# Patient Record
Sex: MALE | Race: WHITE | Employment: OTHER | URBAN - METROPOLITAN AREA
[De-identification: names, ages, dates, MRNs, and addresses within clinical notes are randomized per-mention and may not be internally consistent; named-entity substitution may affect disease eponyms.]

---

## 2017-01-13 ENCOUNTER — TELEPHONE (OUTPATIENT)
Dept: CARDIOLOGY | Age: 72
End: 2017-01-13

## 2017-01-17 ENCOUNTER — CARE COORDINATION (OUTPATIENT)
Dept: FAMILY MEDICINE CLINIC | Age: 72
End: 2017-01-17

## 2017-01-17 DIAGNOSIS — I48.91 ATRIAL FIBRILLATION, UNSPECIFIED TYPE (HCC): ICD-10-CM

## 2017-01-17 PROBLEM — I50.9 CCF (CONGESTIVE CARDIAC FAILURE) (HCC): Status: ACTIVE | Noted: 2017-01-17

## 2017-01-17 RX ORDER — SOTALOL HYDROCHLORIDE 80 MG/1
80 TABLET ORAL 2 TIMES DAILY
COMMUNITY
Start: 2017-01-12 | End: 2017-07-11 | Stop reason: SDUPTHER

## 2017-01-18 ENCOUNTER — OFFICE VISIT (OUTPATIENT)
Dept: CARDIOLOGY | Age: 72
End: 2017-01-18

## 2017-01-18 ENCOUNTER — NURSE ONLY (OUTPATIENT)
Dept: CARDIOLOGY | Age: 72
End: 2017-01-18

## 2017-01-18 VITALS
DIASTOLIC BLOOD PRESSURE: 80 MMHG | OXYGEN SATURATION: 99 % | SYSTOLIC BLOOD PRESSURE: 120 MMHG | HEIGHT: 73 IN | WEIGHT: 315 LBS | BODY MASS INDEX: 41.75 KG/M2 | HEART RATE: 50 BPM

## 2017-01-18 DIAGNOSIS — I48.91 ATRIAL FIBRILLATION STATUS POST CARDIOVERSION (HCC): Primary | ICD-10-CM

## 2017-01-18 DIAGNOSIS — I50.22 CHRONIC SYSTOLIC CONGESTIVE HEART FAILURE (HCC): ICD-10-CM

## 2017-01-18 DIAGNOSIS — I48.20 CHRONIC ATRIAL FIBRILLATION (HCC): ICD-10-CM

## 2017-01-18 DIAGNOSIS — I48.91 ATRIAL FIBRILLATION, UNSPECIFIED TYPE (HCC): ICD-10-CM

## 2017-01-18 DIAGNOSIS — I48.91 ATRIAL FIBRILLATION, UNSPECIFIED TYPE (HCC): Primary | ICD-10-CM

## 2017-01-18 DIAGNOSIS — E66.01 MORBID OBESITY WITH BMI OF 45.0-49.9, ADULT (HCC): ICD-10-CM

## 2017-01-18 DIAGNOSIS — I49.8 BIGEMINY: ICD-10-CM

## 2017-01-18 PROCEDURE — 99214 OFFICE O/P EST MOD 30 MIN: CPT | Performed by: PHYSICIAN ASSISTANT

## 2017-01-18 ASSESSMENT — ENCOUNTER SYMPTOMS
BLOOD IN STOOL: 0
CHEST TIGHTNESS: 0
COUGH: 1
ABDOMINAL PAIN: 0
COLOR CHANGE: 0
VOMITING: 0
DIARRHEA: 0
SHORTNESS OF BREATH: 0
NAUSEA: 0

## 2017-01-20 ENCOUNTER — NURSE ONLY (OUTPATIENT)
Dept: CARDIOLOGY | Age: 72
End: 2017-01-20

## 2017-01-20 DIAGNOSIS — I48.91 ATRIAL FIBRILLATION, UNSPECIFIED TYPE (HCC): Primary | ICD-10-CM

## 2017-01-24 ENCOUNTER — TELEPHONE (OUTPATIENT)
Dept: CARDIOLOGY | Age: 72
End: 2017-01-24

## 2017-01-24 DIAGNOSIS — I48.91 ATRIAL FIBRILLATION, UNSPECIFIED TYPE (HCC): Primary | ICD-10-CM

## 2017-01-24 DIAGNOSIS — R94.31 ABNORMAL HOLTER MONITOR FINDING: ICD-10-CM

## 2017-01-25 ENCOUNTER — TELEPHONE (OUTPATIENT)
Dept: CARDIOLOGY | Age: 72
End: 2017-01-25

## 2017-01-26 ENCOUNTER — HOSPITAL ENCOUNTER (OUTPATIENT)
Dept: NON INVASIVE DIAGNOSTICS | Age: 72
Discharge: HOME | End: 2017-01-26
Payer: MEDICARE

## 2017-01-26 DIAGNOSIS — R94.31 ABNORMAL HOLTER MONITOR FINDING: ICD-10-CM

## 2017-01-26 DIAGNOSIS — I48.91 ATRIAL FIBRILLATION, UNSPECIFIED TYPE (HCC): ICD-10-CM

## 2017-01-26 PROCEDURE — 93225 XTRNL ECG REC<48 HRS REC: CPT

## 2017-01-26 PROCEDURE — 93226 XTRNL ECG REC<48 HR SCAN A/R: CPT

## 2017-02-01 ENCOUNTER — TELEPHONE (OUTPATIENT)
Dept: CARDIOLOGY | Age: 72
End: 2017-02-01

## 2017-02-07 ENCOUNTER — TELEPHONE (OUTPATIENT)
Dept: CARDIOLOGY | Age: 72
End: 2017-02-07

## 2017-02-12 PROBLEM — I50.32 CHRONIC DIASTOLIC CONGESTIVE HEART FAILURE (HCC): Status: ACTIVE | Noted: 2017-01-17

## 2017-03-06 ENCOUNTER — HOSPITAL ENCOUNTER (OUTPATIENT)
Dept: GENERAL RADIOLOGY | Age: 72
Discharge: HOME | End: 2017-03-06
Payer: MEDICARE

## 2017-03-06 ENCOUNTER — OFFICE VISIT (OUTPATIENT)
Dept: FAMILY MEDICINE CLINIC | Age: 72
End: 2017-03-06

## 2017-03-06 VITALS
SYSTOLIC BLOOD PRESSURE: 100 MMHG | HEART RATE: 60 BPM | BODY MASS INDEX: 41.75 KG/M2 | DIASTOLIC BLOOD PRESSURE: 70 MMHG | RESPIRATION RATE: 12 BRPM | WEIGHT: 315 LBS | HEIGHT: 73 IN | TEMPERATURE: 96 F

## 2017-03-06 DIAGNOSIS — R06.00 PND (PAROXYSMAL NOCTURNAL DYSPNEA): ICD-10-CM

## 2017-03-06 DIAGNOSIS — I48.20 CHRONIC ATRIAL FIBRILLATION (HCC): ICD-10-CM

## 2017-03-06 DIAGNOSIS — J30.1 SEASONAL ALLERGIC RHINITIS DUE TO POLLEN: ICD-10-CM

## 2017-03-06 DIAGNOSIS — M51.37 DDD (DEGENERATIVE DISC DISEASE), LUMBOSACRAL: ICD-10-CM

## 2017-03-06 DIAGNOSIS — J18.9 PNEUMONIA OF BOTH LUNGS DUE TO INFECTIOUS ORGANISM, UNSPECIFIED PART OF LUNG: ICD-10-CM

## 2017-03-06 DIAGNOSIS — J18.9 PNEUMONIA OF BOTH LUNGS DUE TO INFECTIOUS ORGANISM, UNSPECIFIED PART OF LUNG: Primary | ICD-10-CM

## 2017-03-06 LAB
ANION GAP SERPL CALCULATED.3IONS-SCNC: 9 MEQ/L (ref 7–13)
BUN BLDV-MCNC: 22 MG/DL (ref 8–23)
CALCIUM SERPL-MCNC: 9.9 MG/DL (ref 8.6–10.2)
CHLORIDE BLD-SCNC: 97 MEQ/L (ref 98–107)
CO2: 30 MEQ/L (ref 22–29)
CREAT SERPL-MCNC: 0.98 MG/DL (ref 0.7–1.2)
GFR AFRICAN AMERICAN: >60
GFR NON-AFRICAN AMERICAN: >60
GLUCOSE BLD-MCNC: 77 MG/DL (ref 74–109)
POTASSIUM SERPL-SCNC: 5.1 MEQ/L (ref 3.5–5.1)
SODIUM BLD-SCNC: 136 MEQ/L (ref 132–144)

## 2017-03-06 PROCEDURE — 99214 OFFICE O/P EST MOD 30 MIN: CPT | Performed by: FAMILY MEDICINE

## 2017-03-06 PROCEDURE — 71020 XR CHEST STANDARD TWO VW: CPT

## 2017-03-06 RX ORDER — CEFUROXIME AXETIL 250 MG/1
250 TABLET ORAL 2 TIMES DAILY
Qty: 20 TABLET | Refills: 0 | Status: SHIPPED | OUTPATIENT
Start: 2017-03-06 | End: 2017-03-16

## 2017-03-09 LAB
2000687N OAK TREE IGE: 0.13 KU/L
ALLERGEN ASPERGILLUS ALTERNATA IGE: <0.1 KU/L
ALLERGEN ASPERGILLUS FUMIGATUS IGE: <0.1 KU/L
ALLERGEN BERMUDA GRASS IGE: <0.1 KU/L
ALLERGEN CAT DANDER IGE: <0.1 KU/L
ALLERGEN COMMON SHORT RAGWEED IGE: <0.1 KU/L
ALLERGEN COTTONWOOD: 0.15 KU/L
ALLERGEN COW EPITHELIA/DANDER IGE: <0.1 KU/L
ALLERGEN DOG DANDER IGE: <0.1 KU/L
ALLERGEN ELM IGE: <0.1 KU/L
ALLERGEN ENGLISH PLANTAIN: 0.12 KU/L
ALLERGEN GREER HOUSE DUST: <0.1 KU/L
ALLERGEN HORMODENDRUM HORDEI IGE: <0.1 KU/L
ALLERGEN HORSE DANDER: <0.1 KU/L
ALLERGEN JOHNSON GRASS IGE: 0.11 KU/L
ALLERGEN JUNE KENTUCKY BLUEGRASS: 0.16 KU/L
ALLERGEN LAMB'S QUARTERS: 0.15 KU/L
ALLERGEN MESQUITE IGE: <0.1 KU/L
ALLERGEN MITE DUST FARINAE IGE: <0.1 KU/L
ALLERGEN MITE DUST PTERONYSSINUS IGE: <0.1 KU/L
ALLERGEN MOUNTAIN CEDAR: 0.2 KU/L
ALLERGEN MUGWORT IGE: <0.1 KU/L
ALLERGEN OLIVE TREE IGE: <0.1 KU/L
ALLERGEN PENICILLIUM NOTATUM: <0.1 KU/L
ALLERGEN PERENNIAL RYE GRASS IGE: 0.12 KU/L
ALLERGEN RUSSIAN THISTLE IGE: 0.1 KU/L
ALLERGEN SEE NOTE: ABNORMAL
ALLERGEN TIMOTHY GRASS: <0.1 KU/L
IGE: 488 KU/L

## 2017-04-06 ENCOUNTER — OFFICE VISIT (OUTPATIENT)
Dept: FAMILY MEDICINE CLINIC | Age: 72
End: 2017-04-06

## 2017-04-06 VITALS
SYSTOLIC BLOOD PRESSURE: 92 MMHG | DIASTOLIC BLOOD PRESSURE: 58 MMHG | HEIGHT: 73 IN | BODY MASS INDEX: 41.75 KG/M2 | TEMPERATURE: 96.4 F | HEART RATE: 66 BPM | RESPIRATION RATE: 12 BRPM | WEIGHT: 315 LBS

## 2017-04-06 DIAGNOSIS — I50.32 CHRONIC DIASTOLIC CONGESTIVE HEART FAILURE (HCC): ICD-10-CM

## 2017-04-06 DIAGNOSIS — J30.1 SEASONAL ALLERGIC RHINITIS DUE TO POLLEN: ICD-10-CM

## 2017-04-06 DIAGNOSIS — I51.7 LVH (LEFT VENTRICULAR HYPERTROPHY): ICD-10-CM

## 2017-04-06 DIAGNOSIS — J18.9 PNEUMONIA DUE TO ORGANISM: Primary | ICD-10-CM

## 2017-04-06 DIAGNOSIS — I48.20 CHRONIC ATRIAL FIBRILLATION (HCC): ICD-10-CM

## 2017-04-06 DIAGNOSIS — E66.01 MORBID OBESITY WITH BMI OF 45.0-49.9, ADULT (HCC): ICD-10-CM

## 2017-04-06 PROCEDURE — 99213 OFFICE O/P EST LOW 20 MIN: CPT | Performed by: FAMILY MEDICINE

## 2017-04-06 RX ORDER — FLUTICASONE PROPIONATE 50 MCG
SPRAY, SUSPENSION (ML) NASAL
Qty: 1 BOTTLE | Refills: 5 | Status: SHIPPED | OUTPATIENT
Start: 2017-04-06 | End: 2018-03-08 | Stop reason: SDUPTHER

## 2017-04-06 ASSESSMENT — PATIENT HEALTH QUESTIONNAIRE - PHQ9
1. LITTLE INTEREST OR PLEASURE IN DOING THINGS: 0
2. FEELING DOWN, DEPRESSED OR HOPELESS: 0
SUM OF ALL RESPONSES TO PHQ QUESTIONS 1-9: 0
SUM OF ALL RESPONSES TO PHQ9 QUESTIONS 1 & 2: 0

## 2017-05-02 ENCOUNTER — OFFICE VISIT (OUTPATIENT)
Dept: CARDIOLOGY | Age: 72
End: 2017-05-02

## 2017-05-02 VITALS
HEART RATE: 76 BPM | SYSTOLIC BLOOD PRESSURE: 120 MMHG | WEIGHT: 315 LBS | HEIGHT: 73 IN | BODY MASS INDEX: 41.75 KG/M2 | DIASTOLIC BLOOD PRESSURE: 70 MMHG

## 2017-05-02 DIAGNOSIS — E66.01 MORBID OBESITY WITH BMI OF 45.0-49.9, ADULT (HCC): ICD-10-CM

## 2017-05-02 DIAGNOSIS — R00.1 SYMPTOMATIC BRADYCARDIA: ICD-10-CM

## 2017-05-02 DIAGNOSIS — I49.5 SSS (SICK SINUS SYNDROME) (HCC): ICD-10-CM

## 2017-05-02 DIAGNOSIS — I48.91 ATRIAL FIBRILLATION, UNSPECIFIED TYPE (HCC): Primary | ICD-10-CM

## 2017-05-02 PROCEDURE — 99213 OFFICE O/P EST LOW 20 MIN: CPT | Performed by: INTERNAL MEDICINE

## 2017-05-02 PROCEDURE — 93000 ELECTROCARDIOGRAM COMPLETE: CPT | Performed by: INTERNAL MEDICINE

## 2017-06-01 ENCOUNTER — CARE COORDINATION (OUTPATIENT)
Dept: CARE COORDINATION | Age: 72
End: 2017-06-01

## 2017-06-07 ENCOUNTER — TELEPHONE (OUTPATIENT)
Dept: CARDIOLOGY | Age: 72
End: 2017-06-07

## 2017-06-07 DIAGNOSIS — I48.91 ATRIAL FIBRILLATION, UNSPECIFIED TYPE (HCC): ICD-10-CM

## 2017-06-07 DIAGNOSIS — I49.5 SSS (SICK SINUS SYNDROME) (HCC): Primary | ICD-10-CM

## 2017-06-19 ENCOUNTER — HOSPITAL ENCOUNTER (OUTPATIENT)
Dept: CARDIAC CATH/INVASIVE PROCEDURES | Age: 72
Discharge: HOME | End: 2017-06-20
Attending: INTERNAL MEDICINE | Admitting: INTERNAL MEDICINE
Payer: MEDICARE

## 2017-06-19 ENCOUNTER — APPOINTMENT (OUTPATIENT)
Dept: GENERAL RADIOLOGY | Age: 72
End: 2017-06-19
Attending: INTERNAL MEDICINE
Payer: MEDICARE

## 2017-06-19 LAB
ANION GAP SERPL CALCULATED.3IONS-SCNC: 10 MEQ/L (ref 7–13)
APTT: 28.1 SEC (ref 21.6–35.4)
BUN BLDV-MCNC: 21 MG/DL (ref 8–23)
CALCIUM SERPL-MCNC: 9.1 MG/DL (ref 8.6–10.2)
CHLORIDE BLD-SCNC: 102 MEQ/L (ref 98–107)
CO2: 24 MEQ/L (ref 22–29)
CREAT SERPL-MCNC: 0.86 MG/DL (ref 0.7–1.2)
GFR AFRICAN AMERICAN: >60
GFR NON-AFRICAN AMERICAN: >60
GLUCOSE BLD-MCNC: 98 MG/DL (ref 74–109)
HCT VFR BLD CALC: 44.1 % (ref 42–52)
HEMOGLOBIN: 14.9 G/DL (ref 14–18)
INR BLD: 1
MCH RBC QN AUTO: 30.6 PG (ref 27–31.3)
MCHC RBC AUTO-ENTMCNC: 33.8 % (ref 33–37)
MCV RBC AUTO: 90.4 FL (ref 80–100)
PDW BLD-RTO: 13.9 % (ref 11.5–14.5)
PLATELET # BLD: 137 K/UL (ref 130–400)
POTASSIUM SERPL-SCNC: 4.1 MEQ/L (ref 3.5–5.1)
PROTHROMBIN TIME: 11.1 SEC (ref 8.1–13.7)
RBC # BLD: 4.88 M/UL (ref 4.7–6.1)
SODIUM BLD-SCNC: 136 MEQ/L (ref 132–144)
WBC # BLD: 6.8 K/UL (ref 4.8–10.8)

## 2017-06-19 PROCEDURE — 85730 THROMBOPLASTIN TIME PARTIAL: CPT

## 2017-06-19 PROCEDURE — 2580000003 HC RX 258: Performed by: INTERNAL MEDICINE

## 2017-06-19 PROCEDURE — 6360000002 HC RX W HCPCS: Performed by: INTERNAL MEDICINE

## 2017-06-19 PROCEDURE — C1785 PMKR, DUAL, RATE-RESP: HCPCS

## 2017-06-19 PROCEDURE — 6360000002 HC RX W HCPCS

## 2017-06-19 PROCEDURE — 33208 INSRT HEART PM ATRIAL & VENT: CPT | Performed by: INTERNAL MEDICINE

## 2017-06-19 PROCEDURE — 2580000003 HC RX 258

## 2017-06-19 PROCEDURE — 85610 PROTHROMBIN TIME: CPT

## 2017-06-19 PROCEDURE — 85027 COMPLETE CBC AUTOMATED: CPT

## 2017-06-19 PROCEDURE — 71010 XR CHEST PORTABLE: CPT

## 2017-06-19 PROCEDURE — C1779 LEAD, PMKR, TRANSVENOUS VDD: HCPCS

## 2017-06-19 PROCEDURE — 6370000000 HC RX 637 (ALT 250 FOR IP): Performed by: INTERNAL MEDICINE

## 2017-06-19 PROCEDURE — 2500000003 HC RX 250 WO HCPCS

## 2017-06-19 PROCEDURE — 80048 BASIC METABOLIC PNL TOTAL CA: CPT

## 2017-06-19 PROCEDURE — 93005 ELECTROCARDIOGRAM TRACING: CPT

## 2017-06-19 PROCEDURE — C1894 INTRO/SHEATH, NON-LASER: HCPCS

## 2017-06-19 RX ORDER — MORPHINE SULFATE 2 MG/ML
2 INJECTION, SOLUTION INTRAMUSCULAR; INTRAVENOUS
Status: DISCONTINUED | OUTPATIENT
Start: 2017-06-19 | End: 2017-06-20 | Stop reason: HOSPADM

## 2017-06-19 RX ORDER — ONDANSETRON 2 MG/ML
4 INJECTION INTRAMUSCULAR; INTRAVENOUS EVERY 6 HOURS PRN
Status: DISCONTINUED | OUTPATIENT
Start: 2017-06-19 | End: 2017-06-20 | Stop reason: HOSPADM

## 2017-06-19 RX ORDER — SODIUM CHLORIDE 0.9 % (FLUSH) 0.9 %
10 SYRINGE (ML) INJECTION EVERY 12 HOURS SCHEDULED
Status: DISCONTINUED | OUTPATIENT
Start: 2017-06-19 | End: 2017-06-20 | Stop reason: HOSPADM

## 2017-06-19 RX ORDER — TRAMADOL HYDROCHLORIDE 50 MG/1
50 TABLET ORAL EVERY 6 HOURS PRN
Status: DISCONTINUED | OUTPATIENT
Start: 2017-06-19 | End: 2017-06-20 | Stop reason: HOSPADM

## 2017-06-19 RX ORDER — TRAMADOL HYDROCHLORIDE 50 MG/1
100 TABLET ORAL EVERY 6 HOURS PRN
Status: DISCONTINUED | OUTPATIENT
Start: 2017-06-19 | End: 2017-06-20 | Stop reason: HOSPADM

## 2017-06-19 RX ORDER — ACETAMINOPHEN 325 MG/1
650 TABLET ORAL EVERY 4 HOURS PRN
Status: DISCONTINUED | OUTPATIENT
Start: 2017-06-19 | End: 2017-06-20 | Stop reason: HOSPADM

## 2017-06-19 RX ORDER — FUROSEMIDE 40 MG/1
40 TABLET ORAL DAILY
Status: DISCONTINUED | OUTPATIENT
Start: 2017-06-19 | End: 2017-06-20 | Stop reason: HOSPADM

## 2017-06-19 RX ORDER — SODIUM CHLORIDE 0.9 % (FLUSH) 0.9 %
10 SYRINGE (ML) INJECTION PRN
Status: DISCONTINUED | OUTPATIENT
Start: 2017-06-19 | End: 2017-06-20 | Stop reason: HOSPADM

## 2017-06-19 RX ORDER — SODIUM CHLORIDE 9 MG/ML
INJECTION, SOLUTION INTRAVENOUS CONTINUOUS
Status: DISCONTINUED | OUTPATIENT
Start: 2017-06-19 | End: 2017-06-20 | Stop reason: HOSPADM

## 2017-06-19 RX ORDER — LISINOPRIL 5 MG/1
5 TABLET ORAL DAILY
Status: DISCONTINUED | OUTPATIENT
Start: 2017-06-19 | End: 2017-06-20 | Stop reason: HOSPADM

## 2017-06-19 RX ORDER — SODIUM CHLORIDE 0.9 % (FLUSH) 0.9 %
10 SYRINGE (ML) INJECTION EVERY 12 HOURS SCHEDULED
Status: DISCONTINUED | OUTPATIENT
Start: 2017-06-19 | End: 2017-06-19 | Stop reason: SDUPTHER

## 2017-06-19 RX ORDER — SOTALOL HYDROCHLORIDE 80 MG/1
80 TABLET ORAL 2 TIMES DAILY
Status: DISCONTINUED | OUTPATIENT
Start: 2017-06-19 | End: 2017-06-20 | Stop reason: HOSPADM

## 2017-06-19 RX ORDER — MORPHINE SULFATE 4 MG/ML
4 INJECTION, SOLUTION INTRAMUSCULAR; INTRAVENOUS
Status: DISCONTINUED | OUTPATIENT
Start: 2017-06-19 | End: 2017-06-20 | Stop reason: HOSPADM

## 2017-06-19 RX ORDER — SODIUM CHLORIDE 0.9 % (FLUSH) 0.9 %
10 SYRINGE (ML) INJECTION PRN
Status: DISCONTINUED | OUTPATIENT
Start: 2017-06-19 | End: 2017-06-19 | Stop reason: SDUPTHER

## 2017-06-19 RX ADMIN — SOTALOL HYDROCHLORIDE 80 MG: 80 TABLET ORAL at 20:35

## 2017-06-19 RX ADMIN — CEFAZOLIN SODIUM 1 G: 1 INJECTION, POWDER, FOR SOLUTION INTRAMUSCULAR; INTRAVENOUS at 16:35

## 2017-06-19 RX ADMIN — SODIUM CHLORIDE: 900 INJECTION, SOLUTION INTRAVENOUS at 08:30

## 2017-06-19 RX ADMIN — CEFAZOLIN SODIUM 1 G: 1 INJECTION, POWDER, FOR SOLUTION INTRAMUSCULAR; INTRAVENOUS at 10:30

## 2017-06-19 RX ADMIN — SODIUM CHLORIDE, PRESERVATIVE FREE 10 ML: 5 INJECTION INTRAVENOUS at 20:35

## 2017-06-20 ENCOUNTER — APPOINTMENT (OUTPATIENT)
Dept: GENERAL RADIOLOGY | Age: 72
End: 2017-06-20
Attending: INTERNAL MEDICINE
Payer: MEDICARE

## 2017-06-20 VITALS
WEIGHT: 315 LBS | SYSTOLIC BLOOD PRESSURE: 106 MMHG | HEIGHT: 75 IN | TEMPERATURE: 98.6 F | HEART RATE: 65 BPM | OXYGEN SATURATION: 98 % | RESPIRATION RATE: 18 BRPM | BODY MASS INDEX: 39.17 KG/M2 | DIASTOLIC BLOOD PRESSURE: 65 MMHG

## 2017-06-20 LAB
ANION GAP SERPL CALCULATED.3IONS-SCNC: 9 MEQ/L (ref 7–13)
BUN BLDV-MCNC: 16 MG/DL (ref 8–23)
CALCIUM SERPL-MCNC: 8.7 MG/DL (ref 8.6–10.2)
CHLORIDE BLD-SCNC: 101 MEQ/L (ref 98–107)
CO2: 26 MEQ/L (ref 22–29)
CREAT SERPL-MCNC: 0.85 MG/DL (ref 0.7–1.2)
GFR AFRICAN AMERICAN: >60
GFR NON-AFRICAN AMERICAN: >60
GLUCOSE BLD-MCNC: 99 MG/DL (ref 74–109)
HCT VFR BLD CALC: 41.3 % (ref 42–52)
HEMOGLOBIN: 13.9 G/DL (ref 14–18)
MCH RBC QN AUTO: 30.7 PG (ref 27–31.3)
MCHC RBC AUTO-ENTMCNC: 33.7 % (ref 33–37)
MCV RBC AUTO: 91.1 FL (ref 80–100)
PDW BLD-RTO: 13.8 % (ref 11.5–14.5)
PLATELET # BLD: 114 K/UL (ref 130–400)
POTASSIUM SERPL-SCNC: 4.4 MEQ/L (ref 3.5–5.1)
RBC # BLD: 4.53 M/UL (ref 4.7–6.1)
SODIUM BLD-SCNC: 136 MEQ/L (ref 132–144)
WBC # BLD: 6.8 K/UL (ref 4.8–10.8)

## 2017-06-20 PROCEDURE — 2580000003 HC RX 258: Performed by: INTERNAL MEDICINE

## 2017-06-20 PROCEDURE — 85027 COMPLETE CBC AUTOMATED: CPT

## 2017-06-20 PROCEDURE — 71020 XR CHEST STANDARD TWO VW: CPT

## 2017-06-20 PROCEDURE — 6360000002 HC RX W HCPCS: Performed by: INTERNAL MEDICINE

## 2017-06-20 PROCEDURE — 93280 PM DEVICE PROGR EVAL DUAL: CPT

## 2017-06-20 PROCEDURE — 36415 COLL VENOUS BLD VENIPUNCTURE: CPT

## 2017-06-20 PROCEDURE — 80048 BASIC METABOLIC PNL TOTAL CA: CPT

## 2017-06-20 PROCEDURE — 6370000000 HC RX 637 (ALT 250 FOR IP): Performed by: INTERNAL MEDICINE

## 2017-06-20 PROCEDURE — 99217 PR OBSERVATION CARE DISCHARGE MANAGEMENT: CPT | Performed by: NURSE PRACTITIONER

## 2017-06-20 RX ADMIN — CEFAZOLIN SODIUM 1 G: 1 INJECTION, POWDER, FOR SOLUTION INTRAMUSCULAR; INTRAVENOUS at 07:38

## 2017-06-20 RX ADMIN — LISINOPRIL 5 MG: 5 TABLET ORAL at 07:38

## 2017-06-20 RX ADMIN — FUROSEMIDE 40 MG: 40 TABLET ORAL at 07:38

## 2017-06-20 RX ADMIN — CEFAZOLIN SODIUM 1 G: 1 INJECTION, POWDER, FOR SOLUTION INTRAMUSCULAR; INTRAVENOUS at 01:19

## 2017-06-20 RX ADMIN — SOTALOL HYDROCHLORIDE 80 MG: 80 TABLET ORAL at 07:38

## 2017-06-20 RX ADMIN — SODIUM CHLORIDE, PRESERVATIVE FREE 10 ML: 5 INJECTION INTRAVENOUS at 07:39

## 2017-06-20 ASSESSMENT — PAIN SCALES - GENERAL: PAINLEVEL_OUTOF10: 0

## 2017-06-21 LAB
EKG ATRIAL RATE: 65 BPM
EKG Q-T INTERVAL: 448 MS
EKG QRS DURATION: 90 MS
EKG QTC CALCULATION (BAZETT): 486 MS
EKG R AXIS: -49 DEGREES
EKG T AXIS: 85 DEGREES
EKG VENTRICULAR RATE: 71 BPM

## 2017-07-07 ENCOUNTER — OFFICE VISIT (OUTPATIENT)
Dept: FAMILY MEDICINE CLINIC | Age: 72
End: 2017-07-07

## 2017-07-07 ENCOUNTER — CARE COORDINATION (OUTPATIENT)
Dept: CARE COORDINATION | Age: 72
End: 2017-07-07

## 2017-07-07 VITALS
HEIGHT: 73 IN | TEMPERATURE: 97.9 F | SYSTOLIC BLOOD PRESSURE: 96 MMHG | RESPIRATION RATE: 18 BRPM | BODY MASS INDEX: 41.75 KG/M2 | DIASTOLIC BLOOD PRESSURE: 62 MMHG | WEIGHT: 315 LBS | HEART RATE: 80 BPM

## 2017-07-07 DIAGNOSIS — I49.5 SSS (SICK SINUS SYNDROME) (HCC): Primary | ICD-10-CM

## 2017-07-07 DIAGNOSIS — I48.20 CHRONIC ATRIAL FIBRILLATION (HCC): ICD-10-CM

## 2017-07-07 DIAGNOSIS — E66.01 MORBID OBESITY WITH BMI OF 45.0-49.9, ADULT (HCC): ICD-10-CM

## 2017-07-07 DIAGNOSIS — L98.9 BENIGN SKIN LESION OF NOSE: ICD-10-CM

## 2017-07-07 DIAGNOSIS — I51.7 LVH (LEFT VENTRICULAR HYPERTROPHY): ICD-10-CM

## 2017-07-07 DIAGNOSIS — Z95.0 S/P CARDIAC PACEMAKER PROCEDURE: ICD-10-CM

## 2017-07-07 DIAGNOSIS — R00.1 SYMPTOMATIC BRADYCARDIA: ICD-10-CM

## 2017-07-07 PROCEDURE — 99214 OFFICE O/P EST MOD 30 MIN: CPT | Performed by: FAMILY MEDICINE

## 2017-07-11 ENCOUNTER — OFFICE VISIT (OUTPATIENT)
Dept: CARDIOLOGY | Age: 72
End: 2017-07-11

## 2017-07-11 VITALS
BODY MASS INDEX: 42.66 KG/M2 | SYSTOLIC BLOOD PRESSURE: 128 MMHG | DIASTOLIC BLOOD PRESSURE: 72 MMHG | HEIGHT: 72 IN | RESPIRATION RATE: 14 BRPM | WEIGHT: 315 LBS | HEART RATE: 80 BPM | OXYGEN SATURATION: 98 %

## 2017-07-11 DIAGNOSIS — R00.1 SYMPTOMATIC BRADYCARDIA: ICD-10-CM

## 2017-07-11 DIAGNOSIS — I49.5 SSS (SICK SINUS SYNDROME) (HCC): ICD-10-CM

## 2017-07-11 DIAGNOSIS — E66.01 MORBID OBESITY WITH BMI OF 45.0-49.9, ADULT (HCC): ICD-10-CM

## 2017-07-11 DIAGNOSIS — Z95.0 S/P CARDIAC PACEMAKER PROCEDURE: ICD-10-CM

## 2017-07-11 DIAGNOSIS — I48.20 CHRONIC ATRIAL FIBRILLATION (HCC): Primary | ICD-10-CM

## 2017-07-11 PROCEDURE — 99214 OFFICE O/P EST MOD 30 MIN: CPT | Performed by: INTERNAL MEDICINE

## 2017-07-11 PROCEDURE — 93000 ELECTROCARDIOGRAM COMPLETE: CPT | Performed by: INTERNAL MEDICINE

## 2017-07-11 RX ORDER — SOTALOL HYDROCHLORIDE 120 MG/1
120 TABLET ORAL 2 TIMES DAILY
Qty: 90 TABLET | Refills: 3 | Status: SHIPPED | OUTPATIENT
Start: 2017-07-11 | End: 2017-11-20 | Stop reason: SDUPTHER

## 2017-07-31 ENCOUNTER — ANESTHESIA EVENT (OUTPATIENT)
Dept: CARDIAC CATH/INVASIVE PROCEDURES | Age: 72
End: 2017-07-31
Payer: MEDICARE

## 2017-08-07 ENCOUNTER — ANESTHESIA (OUTPATIENT)
Dept: CARDIAC CATH/INVASIVE PROCEDURES | Age: 72
End: 2017-08-07
Payer: MEDICARE

## 2017-08-07 ENCOUNTER — HOSPITAL ENCOUNTER (OUTPATIENT)
Dept: CARDIAC CATH/INVASIVE PROCEDURES | Age: 72
Discharge: HOME | End: 2017-08-07
Attending: INTERNAL MEDICINE | Admitting: INTERNAL MEDICINE
Payer: MEDICARE

## 2017-08-07 VITALS
RESPIRATION RATE: 19 BRPM | OXYGEN SATURATION: 98 % | HEART RATE: 61 BPM | TEMPERATURE: 97.5 F | WEIGHT: 315 LBS | DIASTOLIC BLOOD PRESSURE: 54 MMHG | HEIGHT: 75 IN | SYSTOLIC BLOOD PRESSURE: 90 MMHG | BODY MASS INDEX: 39.17 KG/M2

## 2017-08-07 VITALS — SYSTOLIC BLOOD PRESSURE: 99 MMHG | DIASTOLIC BLOOD PRESSURE: 62 MMHG | OXYGEN SATURATION: 98 %

## 2017-08-07 PROBLEM — I48.20 CHRONIC ATRIAL FIBRILLATION (HCC): Status: ACTIVE | Noted: 2017-01-17

## 2017-08-07 LAB
ANION GAP SERPL CALCULATED.3IONS-SCNC: 12 MEQ/L (ref 7–13)
BUN BLDV-MCNC: 23 MG/DL (ref 8–23)
CALCIUM SERPL-MCNC: 9 MG/DL (ref 8.6–10.2)
CHLORIDE BLD-SCNC: 104 MEQ/L (ref 98–107)
CO2: 26 MEQ/L (ref 22–29)
CREAT SERPL-MCNC: 0.94 MG/DL (ref 0.7–1.2)
EKG ATRIAL RATE: 300 BPM
EKG ATRIAL RATE: 61 BPM
EKG P AXIS: 53 DEGREES
EKG P-R INTERVAL: 302 MS
EKG Q-T INTERVAL: 486 MS
EKG Q-T INTERVAL: 490 MS
EKG QRS DURATION: 116 MS
EKG QRS DURATION: 190 MS
EKG QTC CALCULATION (BAZETT): 493 MS
EKG QTC CALCULATION (BAZETT): 520 MS
EKG R AXIS: -48 DEGREES
EKG R AXIS: -78 DEGREES
EKG T AXIS: 133 DEGREES
EKG T AXIS: 90 DEGREES
EKG VENTRICULAR RATE: 61 BPM
EKG VENTRICULAR RATE: 69 BPM
GFR AFRICAN AMERICAN: >60
GFR NON-AFRICAN AMERICAN: >60
GLUCOSE BLD-MCNC: 98 MG/DL (ref 74–109)
POTASSIUM SERPL-SCNC: 4.5 MEQ/L (ref 3.5–5.1)
SODIUM BLD-SCNC: 142 MEQ/L (ref 132–144)

## 2017-08-07 PROCEDURE — 92960 CARDIOVERSION ELECTRIC EXT: CPT | Performed by: INTERNAL MEDICINE

## 2017-08-07 PROCEDURE — 2580000003 HC RX 258: Performed by: INTERNAL MEDICINE

## 2017-08-07 PROCEDURE — 93005 ELECTROCARDIOGRAM TRACING: CPT

## 2017-08-07 PROCEDURE — 80048 BASIC METABOLIC PNL TOTAL CA: CPT

## 2017-08-07 PROCEDURE — 6360000002 HC RX W HCPCS: Performed by: NURSE ANESTHETIST, CERTIFIED REGISTERED

## 2017-08-07 PROCEDURE — 3700000000 HC ANESTHESIA ATTENDED CARE

## 2017-08-07 RX ORDER — PROPOFOL 10 MG/ML
INJECTION, EMULSION INTRAVENOUS PRN
Status: DISCONTINUED | OUTPATIENT
Start: 2017-08-07 | End: 2017-08-07 | Stop reason: SDUPTHER

## 2017-08-07 RX ORDER — SODIUM CHLORIDE 0.9 % (FLUSH) 0.9 %
10 SYRINGE (ML) INJECTION EVERY 12 HOURS SCHEDULED
Status: DISCONTINUED | OUTPATIENT
Start: 2017-08-07 | End: 2017-08-07 | Stop reason: HOSPADM

## 2017-08-07 RX ORDER — SODIUM CHLORIDE 9 MG/ML
INJECTION, SOLUTION INTRAVENOUS CONTINUOUS
Status: DISCONTINUED | OUTPATIENT
Start: 2017-08-07 | End: 2017-08-07 | Stop reason: HOSPADM

## 2017-08-07 RX ORDER — ONDANSETRON 2 MG/ML
4 INJECTION INTRAMUSCULAR; INTRAVENOUS
Status: DISCONTINUED | OUTPATIENT
Start: 2017-08-07 | End: 2017-08-07 | Stop reason: HOSPADM

## 2017-08-07 RX ORDER — SODIUM CHLORIDE 0.9 % (FLUSH) 0.9 %
10 SYRINGE (ML) INJECTION PRN
Status: DISCONTINUED | OUTPATIENT
Start: 2017-08-07 | End: 2017-08-07 | Stop reason: HOSPADM

## 2017-08-07 RX ORDER — PROPOFOL 10 MG/ML
INJECTION, EMULSION INTRAVENOUS PRN
Status: DISCONTINUED | OUTPATIENT
Start: 2017-08-07 | End: 2017-08-07

## 2017-08-07 RX ADMIN — SODIUM CHLORIDE: 900 INJECTION, SOLUTION INTRAVENOUS at 07:57

## 2017-08-07 RX ADMIN — PROPOFOL 20 MG: 10 INJECTION, EMULSION INTRAVENOUS at 08:01

## 2017-08-07 RX ADMIN — PROPOFOL 100 MG: 10 INJECTION, EMULSION INTRAVENOUS at 08:00

## 2017-08-22 ENCOUNTER — HOSPITAL ENCOUNTER (OUTPATIENT)
Dept: CARDIOLOGY | Age: 72
Discharge: HOME | End: 2017-08-22
Payer: MEDICARE

## 2017-08-22 PROCEDURE — 93280 PM DEVICE PROGR EVAL DUAL: CPT

## 2017-09-12 ENCOUNTER — CARE COORDINATION (OUTPATIENT)
Dept: CARE COORDINATION | Age: 72
End: 2017-09-12

## 2017-09-19 ENCOUNTER — OFFICE VISIT (OUTPATIENT)
Dept: CARDIOLOGY | Age: 72
End: 2017-09-19

## 2017-09-19 VITALS
HEART RATE: 72 BPM | WEIGHT: 315 LBS | SYSTOLIC BLOOD PRESSURE: 118 MMHG | OXYGEN SATURATION: 99 % | BODY MASS INDEX: 42.87 KG/M2 | RESPIRATION RATE: 16 BRPM | DIASTOLIC BLOOD PRESSURE: 72 MMHG

## 2017-09-19 DIAGNOSIS — G47.33 OSA (OBSTRUCTIVE SLEEP APNEA): ICD-10-CM

## 2017-09-19 DIAGNOSIS — I48.20 CHRONIC ATRIAL FIBRILLATION (HCC): Primary | ICD-10-CM

## 2017-09-19 DIAGNOSIS — Z95.0 S/P CARDIAC PACEMAKER PROCEDURE: ICD-10-CM

## 2017-09-19 DIAGNOSIS — E66.01 MORBID OBESITY DUE TO EXCESS CALORIES (HCC): ICD-10-CM

## 2017-09-19 DIAGNOSIS — I49.5 SSS (SICK SINUS SYNDROME) (HCC): ICD-10-CM

## 2017-09-19 PROCEDURE — 99213 OFFICE O/P EST LOW 20 MIN: CPT | Performed by: INTERNAL MEDICINE

## 2017-09-19 PROCEDURE — 93000 ELECTROCARDIOGRAM COMPLETE: CPT | Performed by: INTERNAL MEDICINE

## 2017-09-19 RX ORDER — GUAIFENESIN 600 MG/1
1200 TABLET, EXTENDED RELEASE ORAL 2 TIMES DAILY
COMMUNITY
End: 2017-10-02 | Stop reason: ALTCHOICE

## 2017-10-02 ENCOUNTER — OFFICE VISIT (OUTPATIENT)
Dept: FAMILY MEDICINE CLINIC | Age: 72
End: 2017-10-02

## 2017-10-02 VITALS
RESPIRATION RATE: 24 BRPM | TEMPERATURE: 97.7 F | DIASTOLIC BLOOD PRESSURE: 60 MMHG | WEIGHT: 315 LBS | HEIGHT: 75 IN | OXYGEN SATURATION: 98 % | BODY MASS INDEX: 39.17 KG/M2 | SYSTOLIC BLOOD PRESSURE: 98 MMHG | HEART RATE: 62 BPM

## 2017-10-02 DIAGNOSIS — L98.9 SKIN LESION OF FACE: ICD-10-CM

## 2017-10-02 DIAGNOSIS — I51.7 LVH (LEFT VENTRICULAR HYPERTROPHY): ICD-10-CM

## 2017-10-02 DIAGNOSIS — I48.20 CHRONIC ATRIAL FIBRILLATION (HCC): ICD-10-CM

## 2017-10-02 DIAGNOSIS — R00.1 SYMPTOMATIC BRADYCARDIA: ICD-10-CM

## 2017-10-02 DIAGNOSIS — G47.33 OSA (OBSTRUCTIVE SLEEP APNEA): ICD-10-CM

## 2017-10-02 DIAGNOSIS — Z00.00 ROUTINE GENERAL MEDICAL EXAMINATION AT A HEALTH CARE FACILITY: Primary | ICD-10-CM

## 2017-10-02 DIAGNOSIS — I50.32 CHRONIC DIASTOLIC CONGESTIVE HEART FAILURE (HCC): ICD-10-CM

## 2017-10-02 DIAGNOSIS — E66.01 MORBID OBESITY WITH BMI OF 45.0-49.9, ADULT (HCC): ICD-10-CM

## 2017-10-02 PROCEDURE — G0439 PPPS, SUBSEQ VISIT: HCPCS | Performed by: FAMILY MEDICINE

## 2017-10-02 ASSESSMENT — LIFESTYLE VARIABLES
HOW OFTEN DO YOU HAVE SIX OR MORE DRINKS ON ONE OCCASION: 0
HOW MANY STANDARD DRINKS CONTAINING ALCOHOL DO YOU HAVE ON A TYPICAL DAY: 0
HOW OFTEN DO YOU HAVE A DRINK CONTAINING ALCOHOL: 4
AUDIT-C TOTAL SCORE: 4

## 2017-10-02 ASSESSMENT — PATIENT HEALTH QUESTIONNAIRE - PHQ9: SUM OF ALL RESPONSES TO PHQ QUESTIONS 1-9: 0

## 2017-10-02 ASSESSMENT — ANXIETY QUESTIONNAIRES: GAD7 TOTAL SCORE: 0

## 2017-10-02 NOTE — PATIENT INSTRUCTIONS
Patient to continue current medications and diet. Follow-up in 6 months otherwise prn. Personalized Preventive Plan for Primo Whitlock - 10/2/2017  Medicare offers a range of preventive health benefits. Some of the tests and screenings are paid in full while other may be subject to a deductible, co-insurance, and/or copay. Some of these benefits include a comprehensive review of your medical history including lifestyle, illnesses that may run in your family, and various assessments and screenings as appropriate. After reviewing your medical record and screening and assessments performed today your provider may have ordered immunizations, labs, imaging, and/or referrals for you. A list of these orders (if applicable) as well as your Preventive Care list are included within your After Visit Summary for your review. Other Preventive Recommendations:    · A preventive eye exam performed by an eye specialist is recommended every 1-2 years to screen for glaucoma; cataracts, macular degeneration, and other eye disorders. · A preventive dental visit is recommended every 6 months. · Try to get at least 150 minutes of exercise per week or 10,000 steps per day on a pedometer . · Order or download the FREE \"Exercise & Physical Activity: Your Everyday Guide\" from [employer]. Call 4-770.194.1712 or search [employer] online. · You need 3361-6996 mg of calcium and 4807-1596 IU of vitamin D per day. It is possible to meet your calcium requirement with diet alone, but a vitamin D supplement is usually necessary to meet this goal.  · When exposed to the sun, use a sunscreen that protects against both UVA and UVB radiation with an SPF of 30 or greater. Reapply every 2 to 3 hours or after sweating, drying off with a towel, or swimming. · Always wear a seat belt when traveling in a car. Always wear a helmet when riding a bicycle or motorcycle.

## 2017-10-02 NOTE — PROGRESS NOTES
Medicare Annual Wellness Visit  Name: Keysha Raymundo Date: 10/2/2017   MRN: [id number] Sex: Male   Age: 73 y.o. Ethnicity: Non-/Non    : 1945 Race: Jamal Delgado is here for [facility]    He is feeling much better now that his heart is in sinus rhythm. His nose is draining but improving. Screenings for behavioral, psychosocial and functional/safety risks, and cognitive dysfunction are all negative except as indicated below. These results, as well as other patient data from the [address] form, are documented in Flowsheets linked to this Encounter.     No Known Allergies    Past Medical History:   Diagnosis Date    A-fib ([facility] 01/10/2017    received cardioversion    CHF (congestive heart failure) (HCC)     DDD (degenerative disc disease), lumbosacral     LVH (left ventricular hypertrophy)     Morbid obesity due to excess calories ([facility] 2017    Obesity     MORGAN (obstructive sleep apnea)     MORGAN (obstructive sleep apnea) 2017    Seasonal allergic rhinitis     SSS (sick sinus syndrome) ([facility] 2017    Symptomatic bradycardia 2017       Past Surgical History:   Procedure Laterality Date    CARDIAC CATHETERIZATION      CATARACT REMOVAL  2012    both eyes [facility] in [place] done by     COLONOSCOPY  09    DR PLAZA    PACEMAKER INSERTION      TOTAL KNEE ARTHROPLASTY  3/07    RT       Family History   Problem Relation Age of Onset    Asthma Mother     High Blood Pressure Father     Cancer Father     Ataxia Father     Other Father      CEREBRAL HEMMORHAGE    Other Sister      CLL    Other Other      AAA       CareTeam (Including outside providers/suppliers regularly involved in providing care):   Patient Care Team:  Sharlene Dominguez MD as PCP - General (Family Medicine)  Sharlene Dominguez MD as PCP - [facility] Attributed Provider  Elida Sherwood DO as Consulting Physician (Cardiology)  Angie Valadez RN as Care with injury in past year?: no  Fall Risk Interventions:    · None indicated    Depression:  PHQ-2 Score: 0  Depression Interventions:  · None indicated    Anxiety:  Anxiety Score: 0  Anxiety Interventions:  · None indicated    Cognitive: Words recalled: 3  Clock Drawing Test (CDT) Score: Normal  Cognitive Impairment Interventions:  · None indicated    Substance Abuse:  Social History     Social History Main Topics    Smoking status: Never Smoker    Smokeless tobacco: Never Used    Alcohol use Yes      Comment: OCC    Drug use: No    Sexual activity: Not on file     Audit Questionnaire: Screen for Alcohol Misuse  How often do you have a drink containing alcohol?: Four or more times a week  How many standard drinks containing alcohol do you have on a typical day when drinking?: One or two  How often do you have six or more drinks on one occasion?: Never  Audit-C Score: 4  Substance Abuse Interventions:  · None indicated    Health Risk Assessment:   General  In general, how would you say your health is?: Good  In the past 7 days, have you experienced any of the following?: None of These  Do you get the social and emotional support that you need?: Yes  Do you have a Living Will?: Yes  General Health Risk Interventions:  · None indicated    Health Habits/Nutrition  Do you exercise for at least 20 minutes 2-3 times per week?: (!) No  Have you lost any weight without trying in the past 3 months?: No  Do you eat fewer than 2 meals per day?: No  Have you seen a dentist within the past year?: Yes  Body mass index is 42.65 kg/(m^2). Health Habits/Nutrition Interventions:  · Inadequate physical activity:  patient is not ready to increase his/her physical activity level at this time, needs left knee replacement.     Hearing/Vision  Do you or your family notice any trouble with your hearing?: No  Do you have difficulty driving, watching TV, or doing any of your daily activities because of your eyesight?: No  Have you had an eye exam within the past year?: Yes  Hearing/Vision Interventions:  · None indicated    Safety  Do you have working smoke detectors?: Yes  Have all throw rugs been removed or fastened?: Yes  Do you have non-slip mats in all bathtubs?: (!) No  Do all of your stairways have a railing or banister?: Yes  Are your doorways, halls and stairs free of clutter?: Yes  Do you always fasten your seatbelt when you are in a car?: Yes  Safety Interventions:  · Home safety tips provided    ADLs  In the past 7 days, did you need help from others to perform any of the following everyday activities?: None  In the past 7 days, did you need help from others to take care of any of the following?: None  ADL Interventions:  · None indicated    1. Routine general medical examination at a health care facility    2. LVH (left ventricular hypertrophy)    3. Morbid obesity with BMI of 45.0-49.9, adult ([facility]    4. Chronic diastolic congestive heart failure ([facility]    5. Chronic atrial fibrillation (HCC)    6. Symptomatic bradycardia    7. MORGAN (obstructive sleep apnea)    8.  Skin lesion of face      Orders Placed This Encounter   Procedures    Amb External Referral To Plastic Surgery     Referral Priority:   Routine     Referral Type:   Consult for Advice and Opinion     Referral Reason:   Specialty Services Required     Referred to Provider:   Roberto Bird MD     Requested Specialty:   Plastic Surgery     Number of Visits Requested:   1     Personalized Preventive Plan   Current Health Maintenance Status  Immunization History   Administered Date(s) Administered    Influenza Vaccine, unspecified formulation 10/02/2015, 10/27/2016    Pneumococcal 13-valent Conjugate ([id number]) 08/24/2016    Pneumococcal Polysaccharide ([id number]) 10/05/2013    Tdap (Boostrix, Adacel) 05/09/2014    Zoster 09/11/2013        Health Maintenance   Topic Date Due    Hepatitis C screen  1945    Flu vaccine (1) 11/02/2017 (Originally 9/1/2017)    Colon cancer screen colonoscopy  11/19/2019    Lipid screen  08/17/2021    DTaP/Tdap/Td vaccine (2 - Td) 05/09/2024    Zostavax vaccine  Completed    Pneumococcal low/med risk  Completed     Patient will continue to follow-up with Dr. Barbra Walter regarding his paroxysmal atrial fibrillation. Patient was referred to Dr. Angélica Costa for excision of the left nasal skin lesion which is suspicious for basal cell carcinoma. Recommendations for Preventive Services Due: see orders.   Recommended screening schedule for the next 5-10 years is provided to the patient in written form: see Patient Instructions/AVS.

## 2017-10-02 NOTE — PROGRESS NOTES
Subjective:      Patient ID: Brenda Hernandez is a 73 y.o. male.     HPI    Review of Systems    Objective:   Physical Exam    Assessment:      ***      Plan:      ***

## 2017-10-02 NOTE — MR AVS SNAPSHOT
After Visit Summary             Adeola Stein   10/2/2017 2:15 PM   Office Visit    Description:  Male : 1945   Provider:  Emily Gomes MD   Department:  [facility]              Your Follow-Up and Future Appointments         Below is a list of your follow-up and future appointments. This may not be a complete list as you may have made appointments directly with providers that we are not aware of or your providers may have made some for you. Please call your providers to confirm appointments. It is important to keep your appointments. Please bring your current insurance card, photo ID, co-pay, and all medication bottles to your appointment. If self-pay, payment is expected at the time of service. Your To-Do List     Future Appointments Provider Department Dept Phone    2018 9:45 AM Emily Gomes MD [facility] [phone]    Please arrive 15 minutes prior to appointment, bring photo ID and insurance card. 3/20/2018 8:45 AM Syed Smyth DO [facility] [phone]    Please arrive 15 minutes prior to appointment time, bring insurance card and photo ID. Follow-Up    Return for Medicare Annual Wellness Visit in 1 year. Information from Your Visit        Department     Name Address Phone Fax    [facility] [address]. [place] [address] [phone]      You Were Seen for:         Comments    LVH (left ventricular hypertrophy)   [[id number]]         Vital Signs     Blood Pressure Pulse Temperature Respirations Height Weight    98/60 62 97.7 °F (36.5 °C) (Temporal) 24 6' 3\" (1.905 m) 341 lb 3.2 oz (154.8 kg)    Oxygen Saturation Body Mass Index Smoking Status             98% 42.65 kg/m2 Never Smoker         Additional Information about your Body Mass Index (BMI)           Your BMI as listed above is considered obese (30 or more).  BMI is an estimate of body fat, calculated from your height and weight. The higher your BMI, the greater your risk of heart disease, high blood pressure, type 2 diabetes, stroke, gallstones, arthritis, sleep apnea, and certain cancers. BMI is not perfect. It may overestimate body fat in athletes and people who are more muscular. Even a small weight loss (between 5 and 10 percent of your current weight) by decreasing your calorie intake and becoming more physically active will help lower your risk of developing or worsening diseases associated with obesity. Learn more at: [employer]          Instructions    Patient to continue current medications and diet. Follow-up in 6 months otherwise prn. Personalized Preventive Plan for Sushil Elizabeth - 10/2/2017  Medicare offers a range of preventive health benefits. Some of the tests and screenings are paid in full while other may be subject to a deductible, co-insurance, and/or copay. Some of these benefits include a comprehensive review of your medical history including lifestyle, illnesses that may run in your family, and various assessments and screenings as appropriate. After reviewing your medical record and screening and assessments performed today your provider may have ordered immunizations, labs, imaging, and/or referrals for you. A list of these orders (if applicable) as well as your Preventive Care list are included within your After Visit Summary for your review. Other Preventive Recommendations:    · A preventive eye exam performed by an eye specialist is recommended every 1-2 years to screen for glaucoma; cataracts, macular degeneration, and other eye disorders. · A preventive dental visit is recommended every 6 months. · Try to get at least 150 minutes of exercise per week or 10,000 steps per day on a pedometer .   · Order or download the FREE \"Exercise & Physical Activity: Your Everyday provider with the first available appointments. Nasal skin lesion rule out BCC. Additional Information        Basic Information     Date Of Birth Sex Race Ethnicity Preferred Language    1945 Male White Non-/Non  English      Problem List as of 10/2/2017  Date Reviewed: 10/2/2017                MORGAN (obstructive sleep apnea)    Morbid obesity due to excess calories (HCC)    S/P cardiac pacemaker procedure    SSS (sick sinus syndrome) (HCC)    Symptomatic bradycardia    Chronic diastolic congestive heart failure (HCC)    Chronic atrial fibrillation ([facility]    Morbid obesity with BMI of 45.0-49.9, adult (HCC)    Abnormal EKG    LVH (left ventricular hypertrophy)    Seasonal allergic rhinitis    DDD (degenerative disc disease), lumbosacral      Your Goals as of 10/2/2017 at 3:02 PM              9/12/17 7/7/17       Care Coordination    Conditions and Symptoms   On track  On track    Notes    I will schedule office visits, as directed by my provider. I will keep my appointment or reschedule if I have to cancel. I will notify my provider of any barriers to my plan of care. I will follow my Zone Management tool to seek urgent or emergent care. I will notify my provider of any symptoms that indicate a worsening of my condition. Barriers: none  Plan for overcoming my barriers: N/A  Confidence: 7/10  Anticipated Goal Completion Date: 9/1/2017          Immunizations as of 10/2/2017     Name Date    Influenza Vaccine, unspecified formulation 10/27/2016, 10/2/2015    Pneumococcal 13-valent Conjugate ([id number]) 8/24/2016    Pneumococcal Polysaccharide ([id number]) 10/5/2013    Tdap (Boostrix, Adacel) 5/9/2014    Zoster 9/11/2013      Preventive Care        Date Due    Hepatitis C screening is recommended for all adults regardless of risk factors born between [facility] at least once (lifetime) who have never been tested.  1945 Yearly Flu Vaccine (1) 11/2/2017 (Originally 9/1/2017)    Colonoscopy 11/19/2019    Cholesterol Screening 8/17/2021    Tetanus Combination Vaccine (2 - Td) 5/9/2024            MyChart Signup           Our records indicate that you have an active [employer] account. You can view your After Visit Summary by going to [email or web address] and logging in with your [employer] username and password. If you don't have a [employer] username and password but a parent or guardian has access to your record, the parent or guardian should login with their own [employer] username and password and access your record to view the After Visit Summary. Additional Information  If you have questions, please contact the physician practice where you receive care. Remember, [employer] is NOT to be used for urgent needs. For medical emergencies, dial 911. For questions regarding your [employer] account call [phone]. If you have a clinical question, please call your doctor's office.

## 2017-10-03 ENCOUNTER — PATIENT MESSAGE (OUTPATIENT)
Dept: CARDIOLOGY | Age: 72
End: 2017-10-03

## 2017-10-03 ENCOUNTER — TELEPHONE (OUTPATIENT)
Dept: CARDIOLOGY | Age: 72
End: 2017-10-03

## 2017-10-03 NOTE — TELEPHONE ENCOUNTER
See my chart message     Having complete knee surgery on Nov.15th. Dr. Harpreet Carlson wants to stop a Eliquis 5 days before surgery will that be OK.

## 2017-10-31 ENCOUNTER — PATIENT MESSAGE (OUTPATIENT)
Dept: FAMILY MEDICINE CLINIC | Age: 72
End: 2017-10-31

## 2017-11-07 ENCOUNTER — CARE COORDINATION (OUTPATIENT)
Dept: CARE COORDINATION | Age: 72
End: 2017-11-07

## 2017-11-07 NOTE — CARE COORDINATION
Ambulatory Care Coordination Note  11/7/2017  CM Risk Score: 2  Sanam Mortality Risk Score: 9.69    ACC: Sarahi Mccoy LPN    Summary Note: Attempted to contact patient for [facility]. Unable to reach patient by phone. Unable to leave message. No voicemail. Care Coordination Interventions    Program Enrollment:  Rising Risk  Referral from Primary Care Provider:  Yes  Suggested Interventions and Community Resources  [address] [facility] [address]:  Not Started  Cardiac Rehab:  Not Started  Medication Assistance Program:  Not Started  Medi Set or Pill Pack:  Not Started  Registered Dietician:  Not Started  Social Work:  Not Started  Zone Management Tools: In Process  Other Services or Interventions:  CHF          Goals Addressed     None          Prior to Admission medications    Medication Sig Start Date End Date Taking? Authorizing Provider   lisinopril (PRINIVIL;ZESTRIL) 5 MG tablet TAKE 1 TABLET BY MOUTH DAILY 11/2/17   Juliocesar Garcia MD   apixaban (ELIQUIS) 5 MG TABS tablet Take 1 tablet by mouth 2 times daily 9/19/17   Syed Wynne, DO   sotalol (BETAPACE) 120 MG tablet Take 1 tablet by mouth 2 times daily 7/11/17   Syed Wynne, DO   Cetirizine HCl (ZYRTEC ALLERGY PO) Take 10 mg by mouth daily     Historical Provider, MD   fluticasone (FLONASE) 50 MCG/ACT nasal spray INSTILL 2 SPRAYS EACH NOSTRIL DAILY. Patient taking differently: 2 sprays by Nasal route daily INSTILL 2 SPRAYS EACH NOSTRIL DAILY. 4/6/17   Juliocesar Garcia MD   furosemide (LASIX) 40 MG tablet Take 1 tablet by mouth daily 12/13/16   Juliocesar Garcia MD   Multiple Vitamins-Minerals (ONE-A-DAY 50 PLUS PO) Take  by mouth daily.       Historical Provider, MD       Future Appointments  Date Time Provider Scott Ahn   11/21/2017 8:30 AM [facility] [address]   1/8/2018 9:45 AM MD Daniel Barfield [facility] [place]   3/20/2018 8:45 AM Smita Finch DO [facility]

## 2017-11-21 ENCOUNTER — HOSPITAL ENCOUNTER (OUTPATIENT)
Dept: CARDIOLOGY | Age: 72
Discharge: HOME | End: 2017-11-21
Payer: MEDICARE

## 2017-11-21 PROCEDURE — 93296 REM INTERROG EVL PM/IDS: CPT

## 2017-11-21 RX ORDER — SOTALOL HYDROCHLORIDE 120 MG/1
TABLET ORAL
Qty: 180 TABLET | Refills: 1 | Status: SHIPPED | OUTPATIENT
Start: 2017-11-21 | End: 2018-08-27 | Stop reason: SDUPTHER

## 2017-12-28 ENCOUNTER — CARE COORDINATION (OUTPATIENT)
Dept: CARE COORDINATION | Age: 72
End: 2017-12-28

## 2017-12-28 NOTE — CARE COORDINATION
Ambulatory Care Coordination Note  12/28/2017  CM Risk Score: 2  Sanam Mortality Risk Score: 9.69    ACC: Imelda Quiles RN    Summary Note: [facility] OUTREACH  Congestive Heart Failure Assessment    Are you currently restricting fluids?:  No Restriction  Do you understand a low sodium diet?:  Yes  Do you understand how to read food labels?:  Yes  How many restaurant meals do you eat per week?:  1-2  Do you salt your food before tasting it?:  No     No patient-reported symptoms      Symptoms:      Symptom course:  resolved  Patient-reported weight (lb):  (Comment: PATIENT DOES NOT WEIGH HIMSELF AT HOME)  Salt intake watch compared to last visit:  stable         Wt Readings from Last 3 Encounters:   10/02/17 (!) 341 lb 3.2 oz (154.8 kg)   09/19/17 (!) 343 lb (155.6 kg)   08/07/17 (!) 338 lb (153.3 kg)      Tran Ochoa received counseling on the following healthy behaviors: SELF MANAGEMENT    Patient given educational materials on Nutrition, Exercise and OBESITY    I have instructed Tran Ochoa to complete a self tracking handout on [facility] [employer]  and instructed them to bring it with them to his next appointment. Discussed use, benefit, and side effects of prescribed medications. Barriers to medication compliance addressed. All patient questions answered. Pt voiced understanding. Care Coordination Interventions    Program Enrollment:  Rising Risk  Referral from Primary Care Provider:  Yes  Suggested Interventions and Community Resources  [address] [facility] [address]:  Not Started  Cardiac Rehab:  Not Started  Medication Assistance Program:  Not Started  Medi Set or Pill Pack:  Not Started  Registered Dietician:  Not Started  Social Work:  Not Started  Zone Management Tools: In Process  Other Services or Interventions:  CHF          Goals Addressed             Most Recent     Conditions and Symptoms   No change (12/28/2017)             I will schedule office visits, as directed by my provider.   I will keep my appointment or reschedule if I have to cancel. I will notify my provider of any barriers to my plan of care. I will follow my Zone Management tool to seek urgent or emergent care. I will notify my provider of any symptoms that indicate a worsening of my condition. Barriers: none  Plan for overcoming my barriers: N/A  Confidence: 7/10  Anticipated Goal Completion Date: 9/1/2017              Prior to Admission medications    Medication Sig Start Date End Date Taking? Authorizing Provider   sotalol (BETAPACE) 120 MG tablet TAKE ONE TABLET BY MOUTH TWICE DAILY 11/21/17   Syed Wynne, DO   lisinopril (PRINIVIL;ZESTRIL) 5 MG tablet TAKE 1 TABLET BY MOUTH DAILY 11/2/17   Juliocesar Garcia MD   apixaban (ELIQUIS) 5 MG TABS tablet Take 1 tablet by mouth 2 times daily 9/19/17   Syed Wynne, DO   Cetirizine HCl (ZYRTEC ALLERGY PO) Take 10 mg by mouth daily     Historical Provider, MD   fluticasone (FLONASE) 50 MCG/ACT nasal spray INSTILL 2 SPRAYS EACH NOSTRIL DAILY. Patient taking differently: 2 sprays by Nasal route daily INSTILL 2 SPRAYS EACH NOSTRIL DAILY. 4/6/17   Juliocesar Garcia MD   furosemide (LASIX) 40 MG tablet Take 1 tablet by mouth daily 12/13/16   Juliocesar Garcia MD   Multiple Vitamins-Minerals (ONE-A-DAY 50 PLUS PO) Take  by mouth daily.       Historical Provider, MD       Future Appointments  Date Time Provider Scott Ahn   1/8/2018 9:45 AM MD Daniel Barfield [facility] [place]   3/20/2018 8:45 AM Mario Fragoso DO [facility]

## 2018-01-02 ENCOUNTER — CARE COORDINATION (OUTPATIENT)
Dept: CARE COORDINATION | Age: 73
End: 2018-01-02

## 2018-01-02 NOTE — CARE COORDINATION
MATERIALS ON HEALTH RISK OF OBESITY  CARB COUNTING AND MEAL PLANNING   GUIDE TO BEHAVIOR CHANGE  WEIGHTLOSS READINESS TEST AND RESULT KEY

## 2018-01-08 ENCOUNTER — OFFICE VISIT (OUTPATIENT)
Dept: FAMILY MEDICINE CLINIC | Age: 73
End: 2018-01-08

## 2018-01-08 VITALS
DIASTOLIC BLOOD PRESSURE: 60 MMHG | SYSTOLIC BLOOD PRESSURE: 106 MMHG | HEART RATE: 76 BPM | BODY MASS INDEX: 39.17 KG/M2 | OXYGEN SATURATION: 99 % | WEIGHT: 315 LBS | HEIGHT: 75 IN | TEMPERATURE: 98.5 F

## 2018-01-08 DIAGNOSIS — I48.20 CHRONIC ATRIAL FIBRILLATION (HCC): ICD-10-CM

## 2018-01-08 DIAGNOSIS — I49.5 SSS (SICK SINUS SYNDROME) (HCC): ICD-10-CM

## 2018-01-08 DIAGNOSIS — I50.32 HEART FAILURE, DIASTOLIC, CHRONIC (HCC): Primary | ICD-10-CM

## 2018-01-08 DIAGNOSIS — E66.01 MORBID OBESITY WITH BMI OF 40.0-44.9, ADULT (HCC): ICD-10-CM

## 2018-01-08 DIAGNOSIS — J01.90 ACUTE NON-RECURRENT SINUSITIS, UNSPECIFIED LOCATION: ICD-10-CM

## 2018-01-08 PROCEDURE — 99214 OFFICE O/P EST MOD 30 MIN: CPT | Performed by: FAMILY MEDICINE

## 2018-01-08 RX ORDER — AMOXICILLIN AND CLAVULANATE POTASSIUM 875; 125 MG/1; MG/1
1 TABLET, FILM COATED ORAL 2 TIMES DAILY WITH MEALS
Qty: 20 TABLET | Refills: 0 | Status: SHIPPED | OUTPATIENT
Start: 2018-01-08 | End: 2018-01-18

## 2018-01-08 NOTE — PROGRESS NOTES
Chief Complaint   Patient presents with    6 Month Follow-Up     LOV 07/07/17    Sinus Problem     SSS    Bradycardia     w/ LVH    Obesity    Skin Lesion     Nose    Atrial Fibrillation    Discuss Medications     HPI: Bucky Lira is a 81 y.o. male presenting for follow-up of SSS, Bradycardia, LVH, Obesity, and A-Fib. I last saw the patient 6 months ago. He did have a TKR of the left knee on 11-15-17. He did injure his back with a fall about a month ago. He has noted a sinus infection with RN and PND with some nasal congestion. He does not noted a headache. No fever or chills. He will see Dr. Devyn Bryson in March of this year. Past Medical History:   Diagnosis Date    A-fib [facility]) 01/10/2017    received cardioversion    CHF (congestive heart failure) (HCC)     DDD (degenerative disc disease), lumbosacral     LVH (left ventricular hypertrophy)     Morbid obesity due to excess calories ([address] 9/19/2017    Obesity     MORGAN (obstructive sleep apnea)     MORGAN (obstructive sleep apnea) 9/19/2017    Seasonal allergic rhinitis     SSS (sick sinus syndrome) ([address] 5/2/2017    Symptomatic bradycardia 5/2/2017       Past Surgical History:   Procedure Laterality Date    CARDIAC CATHETERIZATION  5/05    CATARACT REMOVAL  12/2012    both eyes ccf in [place] done by     COLONOSCOPY  11/6/09    DR PLAZA    PACEMAKER INSERTION      TOTAL KNEE ARTHROPLASTY  3/07    RT    TOTAL KNEE ARTHROPLASTY Left 11/15/2017    Dr. Tam Nath       family history includes Asthma in his mother; Ataxia in his father; Cancer in his father; High Blood Pressure in his father; Other in his father, sister, and another family member. Social History     Social History    Marital status:      Spouse name: N/A    Number of children: N/A    Years of education: N/A     Occupational History    Not on file.      Social History Main Topics    Smoking status: Never Smoker    Smokeless tobacco: Never Used    Alcohol bridge measuring 8 mm. 1. Heart failure, diastolic, chronic ([facility]     2. Chronic atrial fibrillation (HCC)     3. Morbid obesity with BMI of 40.0-44.9, adult ([facility]     4. Acute non-recurrent sinusitis, unspecified location     5. SSS (sick sinus syndrome) ([facility]       I have reviewed the following diagnostic data: NA.  Please see report for additional information. Quality & Risk Score Accuracy - MEDICARE ADVANTAGE    Visit Dx: Heart failure, diastolic, chronic (HCC)  Asymptomatic. Continue current treatment plan and follow up at least yearly. Visit Dx:  Chronic atrial fibrillation ([facility]  Stable based upon review of recent symptoms and physical exam. Continue current treatment plan and follow up at least yearly. Visit Dx:  Morbid obesity with BMI of 40.0-44.9, adult ([facility]  Improving on review of most recent BMI. Patient counseled on diet and exercise. Additional documentation:  Based on review of the following: The BMI is 41.65 kg/m2. Last edited 01/08/18 10:13 EST by Kaila Ladd MD       Orders Placed This Encounter   Medications    amoxicillin-clavulanate (AUGMENTIN) 875-125 MG per tablet     Sig: Take 1 tablet by mouth 2 times daily (with meals) for 10 days     Dispense:  20 tablet     Refill:  0    apixaban (ELIQUIS) 5 MG TABS tablet     Sig: Take 1 tablet by mouth 2 times daily     Dispense:  42 tablet     Refill:  0     Lot [id number] exp 9/2019     Patient is to finish the entire course of Augmentin as directed. Patient was instructed to drink plenty of fluids. Take Tylenol for pain or fever. Follow-up if signs or symptoms persist or worsen otherwise prn. Patient  may take Mucinex DM OTC as directed for cough and Flonase (fluticasone) nasal spray OTC as directed for nasal and sinus congestion. He would like samples of Eliquis as well. Follow up with Dr. Hugh Whitney as scheduled. Return in about 6 months (around 7/8/2018) for follow up on medications, follow up on HTN.

## 2018-02-07 ENCOUNTER — CARE COORDINATION (OUTPATIENT)
Dept: CARE COORDINATION | Age: 73
End: 2018-02-07

## 2018-02-20 ENCOUNTER — HOSPITAL ENCOUNTER (OUTPATIENT)
Dept: CARDIOLOGY | Age: 73
Discharge: HOME | End: 2018-02-20
Payer: MEDICARE

## 2018-02-20 PROCEDURE — 93280 PM DEVICE PROGR EVAL DUAL: CPT

## 2018-03-08 RX ORDER — FLUTICASONE PROPIONATE 50 MCG
SPRAY, SUSPENSION (ML) NASAL
Qty: 16 G | Refills: 5 | Status: SHIPPED | OUTPATIENT
Start: 2018-03-08 | End: 2018-11-12 | Stop reason: RX

## 2018-03-20 ENCOUNTER — OFFICE VISIT (OUTPATIENT)
Dept: CARDIOLOGY CLINIC | Age: 73
End: 2018-03-20
Payer: MEDICARE

## 2018-03-20 VITALS
DIASTOLIC BLOOD PRESSURE: 70 MMHG | WEIGHT: 315 LBS | SYSTOLIC BLOOD PRESSURE: 110 MMHG | BODY MASS INDEX: 39.17 KG/M2 | HEIGHT: 75 IN | HEART RATE: 81 BPM

## 2018-03-20 DIAGNOSIS — I48.20 CHRONIC ATRIAL FIBRILLATION (HCC): Primary | ICD-10-CM

## 2018-03-20 DIAGNOSIS — I50.32 CHRONIC DIASTOLIC CONGESTIVE HEART FAILURE (HCC): ICD-10-CM

## 2018-03-20 DIAGNOSIS — I49.5 SSS (SICK SINUS SYNDROME) (HCC): ICD-10-CM

## 2018-03-20 DIAGNOSIS — E66.01 MORBID OBESITY DUE TO EXCESS CALORIES (HCC): ICD-10-CM

## 2018-03-20 DIAGNOSIS — Z95.0 S/P CARDIAC PACEMAKER PROCEDURE: ICD-10-CM

## 2018-03-20 DIAGNOSIS — G47.33 OSA (OBSTRUCTIVE SLEEP APNEA): ICD-10-CM

## 2018-03-20 PROBLEM — R00.1 SYMPTOMATIC BRADYCARDIA: Status: RESOLVED | Noted: 2017-05-02 | Resolved: 2018-03-20

## 2018-03-20 PROCEDURE — 99213 OFFICE O/P EST LOW 20 MIN: CPT | Performed by: INTERNAL MEDICINE

## 2018-03-20 PROCEDURE — 93000 ELECTROCARDIOGRAM COMPLETE: CPT | Performed by: INTERNAL MEDICINE

## 2018-03-20 NOTE — PROGRESS NOTES
Chief Complaint   Patient presents with    Atrial Fibrillation       12-8-16: Patient presents for initial medical evaluation. Patient is followed on a regular basis by Dr. Reva Carolina MD. Was in [facility] with a lung infection a year ago. States his Echo with EF of 45%, but per recent echo in 4/2016 with EF of 50%, mod PHTN, mild MR, grade II DD, biatrial enlargement. He did have a LHC in [facility] and had mild CAD per patient, no PCI was performed. Pt denies chest pain, dyspnea, dyspnea on exertion, change in exercise capacity, fatigue,  nausea, vomiting, diarrhea, constipation, motor weakness, insomnia, weight loss, syncope, dizziness, lightheadedness, palpitations, PND, orthopnea, or claudication. Was noted to have new onset afibb by pcp and placed on Eliquis. He does have MORGAN and compliant with CPAP machine. 5-2-17: s/p sotalol loading at [facility] and CV. EKG today shows Afibb with CVR, normal QTC. did have PNA down in [place]. S/p event monitors in [place] and b showing 2 sec pauses, HR in 40's at times, mostly in 50-60's, some episodes into 130-150's. Compliant with CPAP machine nightly. States he is fatigued and sleeps more than he usually does. Does need knee replacement surgery in 9/2017. Pt denies chest pain,   nausea, vomiting, diarrhea, constipation, motor weakness, insomnia, weight loss, syncope, dizziness, lightheadedness, palpitations, PND, orthopnea. 7-11-17: s/p dual chamber PPM placement at [facility] in 6/2017. States he feels good overall. Pacemaker site is healing well. Pt denies chest pain, dyspnea, dyspnea on exertion, change in exercise capacity, fatigue,  nausea, vomiting, diarrhea, constipation, motor weakness, insomnia, weight loss, syncope, dizziness, lightheadedness, palpitations, PND, orthopnea, or claudication. On Sotalol and Eliquis    9-19-17: s/p CV at [facility]. EKG with Apaced rhythm. QTC is 441ms. He is feeling good. On [address] and no bleeding issues.  States he has more energy now as well. Pt denies chest pain, dyspnea, dyspnea on exertion, change in exercise capacity, fatigue,  nausea, vomiting, diarrhea, constipation, motor weakness, insomnia, weight loss, syncope, dizziness, lightheadedness, palpitations, PND, orthopnea, or claudication. Does have some URI type symptoms now. Wants to get left knee operated on. Not able to lose weight. Compliant with CPAP.       3-20-18: s/p knee surgery and did well. s/p PPM check and was fine. Breathing ok. Pt denies chest pain, dyspnea, dyspnea on exertion, change in exercise capacity, fatigue,  nausea, vomiting, diarrhea, constipation, motor weakness, insomnia, weight loss, syncope, dizziness, lightheadedness, palpitations, PND, orthopnea, or claudication. Using a wiley to ambulate. No nitro use. BP and hr are good. CAD is stable. No LE discoloration or ulcers. No LE edema. No CHF type symptoms. Lipid profile is normal. EKG with first degree AVB, normal QTC. Remains on sotalol and Eliquis, no bleeding issues.        Patient Active Problem List   Diagnosis    LVH (left ventricular hypertrophy)    Seasonal allergic rhinitis    DDD (degenerative disc disease), lumbosacral    Abnormal EKG    Morbid obesity with BMI of 45.0-49.9, adult (HCC)    Chronic diastolic congestive heart failure (HCC)    Chronic atrial fibrillation (HCC)    SSS (sick sinus syndrome) ([address]    S/P cardiac pacemaker procedure    MORGAN (obstructive sleep apnea)    Morbid obesity due to excess calories [facility])       Past Surgical History:   Procedure Laterality Date    CARDIAC CATHETERIZATION  5/05    CATARACT REMOVAL  12/2012    both eyes ccf in [place] done by     COLONOSCOPY  11/6/09    DR PLAZA    PACEMAKER INSERTION      TOTAL KNEE ARTHROPLASTY  3/07    RT    TOTAL KNEE ARTHROPLASTY Left 11/15/2017    Dr. Risa Rose History     Social History    Marital status:      Spouse name: N/A    Number of children: N/A    Years of education: N/A     Social EKG    Follow up with [facility]. Patient is to avoid any excessive caffeine, chocolate, or OTC stimulants. Thank you for allowing me to participate in the care of your patient, please don't hesitate to contact me if you have any further questions.

## 2018-04-09 ENCOUNTER — CARE COORDINATION (OUTPATIENT)
Dept: CARE COORDINATION | Age: 73
End: 2018-04-09

## 2018-04-10 RX ORDER — DIPHENHYDRAMINE HCL 25 MG
25 CAPSULE ORAL 2 TIMES DAILY
COMMUNITY
End: 2021-06-08

## 2018-05-12 ENCOUNTER — PATIENT MESSAGE (OUTPATIENT)
Dept: FAMILY MEDICINE CLINIC | Age: 73
End: 2018-05-12

## 2018-05-22 ENCOUNTER — HOSPITAL ENCOUNTER (OUTPATIENT)
Dept: CARDIOLOGY | Age: 73
Discharge: HOME | End: 2018-05-22
Payer: MEDICARE

## 2018-05-22 PROCEDURE — 93296 REM INTERROG EVL PM/IDS: CPT

## 2018-05-25 RX ORDER — FUROSEMIDE 40 MG/1
40 TABLET ORAL DAILY
Qty: 90 TABLET | Refills: 3 | Status: SHIPPED | OUTPATIENT
Start: 2018-05-25

## 2018-06-04 ENCOUNTER — CARE COORDINATION (OUTPATIENT)
Dept: CARE COORDINATION | Age: 73
End: 2018-06-04

## 2018-07-16 ENCOUNTER — OFFICE VISIT (OUTPATIENT)
Dept: FAMILY MEDICINE CLINIC | Age: 73
End: 2018-07-16
Payer: MEDICARE

## 2018-07-16 VITALS
HEIGHT: 75 IN | TEMPERATURE: 97.5 F | HEART RATE: 120 BPM | DIASTOLIC BLOOD PRESSURE: 60 MMHG | SYSTOLIC BLOOD PRESSURE: 96 MMHG | BODY MASS INDEX: 39.17 KG/M2 | OXYGEN SATURATION: 95 % | WEIGHT: 315 LBS

## 2018-07-16 DIAGNOSIS — Z13.220 SCREENING, LIPID: ICD-10-CM

## 2018-07-16 DIAGNOSIS — E66.01 MORBID OBESITY DUE TO EXCESS CALORIES (HCC): ICD-10-CM

## 2018-07-16 DIAGNOSIS — I50.32 CHRONIC DIASTOLIC CONGESTIVE HEART FAILURE (HCC): Primary | ICD-10-CM

## 2018-07-16 DIAGNOSIS — I48.20 CHRONIC ATRIAL FIBRILLATION (HCC): ICD-10-CM

## 2018-07-16 DIAGNOSIS — I49.5 SSS (SICK SINUS SYNDROME) (HCC): ICD-10-CM

## 2018-07-16 DIAGNOSIS — I51.7 LVH (LEFT VENTRICULAR HYPERTROPHY): ICD-10-CM

## 2018-07-16 DIAGNOSIS — M75.42 IMPINGEMENT SYNDROME OF LEFT SHOULDER: ICD-10-CM

## 2018-07-16 PROCEDURE — 99214 OFFICE O/P EST MOD 30 MIN: CPT | Performed by: FAMILY MEDICINE

## 2018-07-16 ASSESSMENT — PATIENT HEALTH QUESTIONNAIRE - PHQ9
1. LITTLE INTEREST OR PLEASURE IN DOING THINGS: 0
SUM OF ALL RESPONSES TO PHQ QUESTIONS 1-9: 0
SUM OF ALL RESPONSES TO PHQ9 QUESTIONS 1 & 2: 0
2. FEELING DOWN, DEPRESSED OR HOPELESS: 0

## 2018-07-20 DIAGNOSIS — Z13.220 SCREENING, LIPID: ICD-10-CM

## 2018-07-20 DIAGNOSIS — I48.20 CHRONIC ATRIAL FIBRILLATION (HCC): ICD-10-CM

## 2018-07-20 LAB
ALBUMIN SERPL-MCNC: 4 G/DL (ref 3.9–4.9)
ALP BLD-CCNC: 84 U/L (ref 35–104)
ALT SERPL-CCNC: 18 U/L (ref 0–41)
ANION GAP SERPL CALCULATED.3IONS-SCNC: 14 MEQ/L (ref 7–13)
AST SERPL-CCNC: 21 U/L (ref 0–40)
BASOPHILS ABSOLUTE: 0 K/UL (ref 0–0.2)
BASOPHILS RELATIVE PERCENT: 0.6 %
BILIRUB SERPL-MCNC: 0.8 MG/DL (ref 0–1.2)
BUN BLDV-MCNC: 20 MG/DL (ref 8–23)
CALCIUM SERPL-MCNC: 9.4 MG/DL (ref 8.6–10.2)
CHLORIDE BLD-SCNC: 100 MEQ/L (ref 98–107)
CHOLESTEROL, TOTAL: 156 MG/DL (ref 0–199)
CO2: 25 MEQ/L (ref 22–29)
CREAT SERPL-MCNC: 0.98 MG/DL (ref 0.7–1.2)
EOSINOPHILS ABSOLUTE: 0.1 K/UL (ref 0–0.7)
EOSINOPHILS RELATIVE PERCENT: 2.4 %
GFR AFRICAN AMERICAN: >60
GFR NON-AFRICAN AMERICAN: >60
GLOBULIN: 3.6 G/DL (ref 2.3–3.5)
GLUCOSE BLD-MCNC: 88 MG/DL (ref 74–109)
HCT VFR BLD CALC: 40.8 % (ref 42–52)
HDLC SERPL-MCNC: 38 MG/DL (ref 40–59)
HEMOGLOBIN: 13.8 G/DL (ref 14–18)
LDL CHOLESTEROL CALCULATED: 98 MG/DL (ref 0–129)
LYMPHOCYTES ABSOLUTE: 1.3 K/UL (ref 1–4.8)
LYMPHOCYTES RELATIVE PERCENT: 20.5 %
MCH RBC QN AUTO: 30.9 PG (ref 27–31.3)
MCHC RBC AUTO-ENTMCNC: 33.9 % (ref 33–37)
MCV RBC AUTO: 91.2 FL (ref 80–100)
MONOCYTES ABSOLUTE: 0.6 K/UL (ref 0.2–0.8)
MONOCYTES RELATIVE PERCENT: 9.1 %
NEUTROPHILS ABSOLUTE: 4.1 K/UL (ref 1.4–6.5)
NEUTROPHILS RELATIVE PERCENT: 67.4 %
PDW BLD-RTO: 14.5 % (ref 11.5–14.5)
PLATELET # BLD: 161 K/UL (ref 130–400)
POTASSIUM SERPL-SCNC: 4.6 MEQ/L (ref 3.5–5.1)
RBC # BLD: 4.47 M/UL (ref 4.7–6.1)
SODIUM BLD-SCNC: 139 MEQ/L (ref 132–144)
TOTAL PROTEIN: 7.6 G/DL (ref 6.4–8.1)
TRIGL SERPL-MCNC: 100 MG/DL (ref 0–200)
WBC # BLD: 6.1 K/UL (ref 4.8–10.8)

## 2018-08-17 ENCOUNTER — CARE COORDINATION (OUTPATIENT)
Dept: CARE COORDINATION | Age: 73
End: 2018-08-17

## 2018-08-17 NOTE — CARE COORDINATION
)  Cardiac Rehab:  Declined  Disease Specific Clinic:  (Comment: CHF CLINIC NOT INDICATED AT THIS TIME )  Medication Assistance Program:  Completed  Medi Set or Pill Pack:  Completed  Palliative Care:  (Comment: NOT NEEDED AT THIS TIME )  Registered Dietician:  Declined  Social Work:  Declined  Zone Management Tools: In Process  Other Services or Interventions:  CHF          Goals Addressed             Most Recent     Activity Plan   No change (8/17/2018)             I will increase my activity by GOING BACK TO GYM. Barriers: lack of motivation  Plan for overcoming my barriers: WIFE   Confidence: 6/10  Anticipated Goal Completion Date: 6/1/2018       COMPLETED: Conditions and Symptoms   No change (6/4/2018)             I will schedule office visits, as directed by my provider. I will keep my appointment or reschedule if I have to cancel. I will notify my provider of any barriers to my plan of care. I will follow my Zone Management tool to seek urgent or emergent care. I will notify my provider of any symptoms that indicate a worsening of my condition. Barriers: none  Plan for overcoming my barriers: N/A  Confidence: 7/10  Anticipated Goal Completion Date: 6/1/2018              Prior to Admission medications    Medication Sig Start Date End Date Taking?  Authorizing Provider   Nutritional Supplements (JUICE PLUS FIBRE PO) Take by mouth    Historical Provider, MD   apixaban (ELIQUIS) 5 MG TABS tablet Take 1 tablet by mouth 2 times daily 7/16/18   Mykel Shen MD   furosemide (LASIX) 40 MG tablet TAKE 1 TABLET BY MOUTH DAILY 5/25/18   Mykel Shen MD   diphenhydrAMINE (ALLERGY) 25 MG capsule Take 25 mg by mouth 2 times daily    Historical Provider, MD   apixaban (ELIQUIS) 5 MG TABS tablet Take 1 tablet by mouth 2 times daily 3/20/18   Syed Wynne DO   fluticasone (FLONASE) 50 MCG/ACT nasal spray USE 2 SPRAYS IN EACH NOSTRIL TWICE DAILY 3/8/18   Mykel Shen MD   sotalol (BETAPACE) 120 MG tablet

## 2018-08-17 NOTE — PATIENT INSTRUCTIONS
Please review Sveta videos using attached link and access code       Patient Christy Chowdhury MD    Your Healthcare Provider has prescribed an Sveta® program for you. This program gives you accurate, easy-to-understand information about your health when you need it most.   PROGRAM:   HEART FAILURE     GO TO THIS SITE:  [email or web address]    ACCESS CODE:  [id number]

## 2018-08-21 ENCOUNTER — HOSPITAL ENCOUNTER (OUTPATIENT)
Dept: CARDIOLOGY | Age: 73
Discharge: HOME | End: 2018-08-21
Payer: MEDICARE

## 2018-08-21 PROCEDURE — 93280 PM DEVICE PROGR EVAL DUAL: CPT

## 2018-08-27 RX ORDER — APIXABAN 5 MG/1
5 TABLET, FILM COATED ORAL 2 TIMES DAILY
Qty: 180 TABLET | Refills: 1 | Status: SHIPPED | OUTPATIENT
Start: 2018-08-27 | End: 2019-02-21 | Stop reason: SDUPTHER

## 2018-08-31 RX ORDER — SOTALOL HYDROCHLORIDE 120 MG/1
TABLET ORAL
Qty: 180 TABLET | Refills: 1 | Status: SHIPPED | OUTPATIENT
Start: 2018-08-31 | End: 2019-05-20 | Stop reason: SDUPTHER

## 2018-09-18 ENCOUNTER — OFFICE VISIT (OUTPATIENT)
Dept: CARDIOLOGY CLINIC | Age: 73
End: 2018-09-18
Payer: MEDICARE

## 2018-09-18 VITALS
WEIGHT: 315 LBS | OXYGEN SATURATION: 98 % | DIASTOLIC BLOOD PRESSURE: 64 MMHG | BODY MASS INDEX: 39.17 KG/M2 | HEART RATE: 79 BPM | SYSTOLIC BLOOD PRESSURE: 122 MMHG | HEIGHT: 75 IN | RESPIRATION RATE: 16 BRPM

## 2018-09-18 DIAGNOSIS — Z95.0 S/P CARDIAC PACEMAKER PROCEDURE: ICD-10-CM

## 2018-09-18 DIAGNOSIS — I48.20 CHRONIC ATRIAL FIBRILLATION (HCC): Primary | ICD-10-CM

## 2018-09-18 DIAGNOSIS — G47.33 OSA (OBSTRUCTIVE SLEEP APNEA): ICD-10-CM

## 2018-09-18 DIAGNOSIS — E66.01 MORBID OBESITY DUE TO EXCESS CALORIES (HCC): ICD-10-CM

## 2018-09-18 DIAGNOSIS — I49.5 SSS (SICK SINUS SYNDROME) (HCC): ICD-10-CM

## 2018-09-18 PROCEDURE — 99214 OFFICE O/P EST MOD 30 MIN: CPT | Performed by: INTERNAL MEDICINE

## 2018-09-18 PROCEDURE — 93000 ELECTROCARDIOGRAM COMPLETE: CPT | Performed by: INTERNAL MEDICINE

## 2018-09-18 NOTE — PROGRESS NOTES
Chief Complaint   Patient presents with    6 Month Follow-Up    Congestive Heart Failure    Atrial Fibrillation       12-8-16: Patient presents for initial medical evaluation. Patient is followed on a regular basis by Dr. Kurt Anguiano MD. Was in [place] with a lung infection a year ago. States his Echo with EF of 45%, but per recent echo in 4/2016 with EF of 50%, mod PHTN, mild MR, grade II DD, biatrial enlargement. He did have a LHC in [place] and had mild CAD per patient, no PCI was performed. Pt denies chest pain, dyspnea, dyspnea on exertion, change in exercise capacity, fatigue,  nausea, vomiting, diarrhea, constipation, motor weakness, insomnia, weight loss, syncope, dizziness, lightheadedness, palpitations, PND, orthopnea, or claudication. Was noted to have new onset afibb by pcp and placed on Eliquis. He does have MORGAN and compliant with CPAP machine. 5-2-17: s/p sotalol loading at [facility] and . EKG today shows Afibb with CVR, normal QTC. did have PNA down in [place]. S/p event monitors in [place] and b showing 2 sec pauses, HR in 40's at times, mostly in 50-60's, some episodes into 130-150's. Compliant with CPAP machine nightly. States he is fatigued and sleeps more than he usually does. Does need knee replacement surgery in 9/2017. Pt denies chest pain,   nausea, vomiting, diarrhea, constipation, motor weakness, insomnia, weight loss, syncope, dizziness, lightheadedness, palpitations, PND, orthopnea. 7-11-17: s/p dual chamber PPM placement at [facility] in 6/2017. States he feels good overall. Pacemaker site is healing well. Pt denies chest pain, dyspnea, dyspnea on exertion, change in exercise capacity, fatigue,  nausea, vomiting, diarrhea, constipation, motor weakness, insomnia, weight loss, syncope, dizziness, lightheadedness, palpitations, PND, orthopnea, or claudication. On Sotalol and Eliquis    9-19-17: s/p CV at [facility]. EKG with Apaced rhythm. QTC is 441ms. He is feeling good.  On [facility] and no bleeding issues. States he has more energy now as well. Pt denies chest pain, dyspnea, dyspnea on exertion, change in exercise capacity, fatigue,  nausea, vomiting, diarrhea, constipation, motor weakness, insomnia, weight loss, syncope, dizziness, lightheadedness, palpitations, PND, orthopnea, or claudication. Does have some URI type symptoms now. Wants to get left knee operated on. Not able to lose weight. Compliant with CPAP.       3-20-18: s/p knee surgery and did well. s/p PPM check and was fine. Breathing ok. Pt denies chest pain, dyspnea, dyspnea on exertion, change in exercise capacity, fatigue,  nausea, vomiting, diarrhea, constipation, motor weakness, insomnia, weight loss, syncope, dizziness, lightheadedness, palpitations, PND, orthopnea, or claudication. Using a wiley to ambulate. No nitro use. BP and hr are good. CAD is stable. No LE discoloration or ulcers. No LE edema. No CHF type symptoms. Lipid profile is normal. EKG with first degree AVB, normal QTC. Remains on sotalol and Eliquis, no bleeding issues. 9-18-18: Pt denies chest pain, dyspnea, dyspnea on exertion, change in exercise capacity, fatigue,  nausea, vomiting, diarrhea, constipation, motor weakness, insomnia, weight loss, syncope, dizziness, lightheadedness, palpitations, PND, orthopnea, or claudication. No nitro use. BP and hr are good. CAD is stable. No LE discoloration or ulcers. No LE edema. No CHF type symptoms. Lipid profile is normal. No recent hospitalization. No change in meds. Remains on sotalol 120mg BID and NOAC. No bleeding issues. EKG with first degree AVB, normal QTC. States he is active in his yard. LDL is 98. Compliant with CPAP. Not losing weight.        Patient Active Problem List   Diagnosis    LVH (left ventricular hypertrophy)    Seasonal allergic rhinitis    DDD (degenerative disc disease), lumbosacral    Abnormal EKG    Morbid obesity with BMI of 45.0-49.9, adult (HCC)    Chronic diastolic congestive heart failure (HCC)    Chronic atrial fibrillation (HCC)    SSS (sick sinus syndrome) (HCC)    S/P cardiac pacemaker procedure    MORGAN (obstructive sleep apnea)    Morbid obesity due to excess calories [facility])       Past Surgical History:   Procedure Laterality Date    CARDIAC CATHETERIZATION  5/05    CATARACT REMOVAL  12/2012    both eyes ccf in [place] done by     COLONOSCOPY  11/6/09    DR PLAZA    PACEMAKER INSERTION      TOTAL KNEE ARTHROPLASTY  3/07    RT    TOTAL KNEE ARTHROPLASTY Left 11/15/2017    Dr. Andres Hernandez History     Social History    Marital status:      Spouse name: N/A    Number of children: N/A    Years of education: N/A     Social History Main Topics    Smoking status: Never Smoker    Smokeless tobacco: Never Used    Alcohol use Yes      Comment: OCC    Drug use: No    Sexual activity: Not Asked     Other Topics Concern    None     Social History Narrative    None       Family History   Problem Relation Age of Onset    Asthma Mother     High Blood Pressure Father     Cancer Father     Ataxia Father     Other Father         CEREBRAL HEMMORHAGE    Other Sister         CLL    Other Other         AAA       Current Outpatient Prescriptions   Medication Sig Dispense Refill    sotalol (BETAPACE) 120 MG tablet TAKE ONE TABLET BY MOUTH TWICE DAILY 180 tablet 1    ELIQUIS 5 MG TABS tablet TAKE 1 TABLET BY MOUTH 2 TIMES DAILY 180 tablet 1    Nutritional Supplements (JUICE PLUS FIBRE PO) Take by mouth      furosemide (LASIX) 40 MG tablet TAKE 1 TABLET BY MOUTH DAILY 90 tablet 3    diphenhydrAMINE (ALLERGY) 25 MG capsule Take 25 mg by mouth 2 times daily      fluticasone (FLONASE) 50 MCG/ACT nasal spray USE 2 SPRAYS IN EACH NOSTRIL TWICE DAILY 16 g 5    lisinopril (PRINIVIL;ZESTRIL) 5 MG tablet TAKE 1 TABLET BY MOUTH DAILY 90 tablet 3     No current facility-administered medications for this visit. Patient has no known allergies.     Review of Systems:  General ROS: negative  Psychological ROS: negative  Hematological and Lymphatic ROS: No history of blood clots or bleeding disorder. Respiratory ROS: no cough, shortness of breath, or wheezing  Cardiovascular ROS: no chest pain or dyspnea on exertion  Gastrointestinal ROS: no abdominal pain, change in bowel habits, or black or bloody stools  Genito-Urinary ROS: no dysuria, trouble voiding, or hematuria  Musculoskeletal ROS: negative  Neurological ROS: negative  Dermatological ROS: negative    VITALS:  Blood pressure 122/64, pulse 79, resp. rate 16, height 6' 3\" (1.905 m), weight (!) 353 lb 6.4 oz (160.3 kg), SpO2 98 %. Body mass index is 44.17 kg/m². Physical Examination:  General appearance - alert, well appearing, and in no distress and overweight  Mental status - alert, oriented to person, place, and time  Neck - Neck is supple, no JVD or carotid bruits. No thyromegaly or adenopathy. Chest - clear to auscultation, no wheezes, rales or rhonchi, symmetric air entry  Heart - normal rate, irregular rhythm, normal S1, S2, no murmurs, rubs, clicks or gallops  Abdomen - soft, nontender, nondistended, no masses or organomegaly  Neurological - alert, oriented, normal speech, no focal findings or movement disorder noted  Extremities - peripheral pulses normal, no pedal edema, no clubbing or cyanosis  Skin - normal coloration and turgor, no rashes, no suspicious skin lesions noted    Pacemaker site looks good. Orders Placed This Encounter   Procedures    EKG 12 lead       ASSESSMENT:     Diagnosis Orders   1. Chronic atrial fibrillation (HCC)  EKG 12 lead   2. Morbid obesity due to excess calories ([address]     3. MORGAN (obstructive sleep apnea)     4. S/P cardiac pacemaker procedure     5. SSS (sick sinus syndrome) ([facility])           PLAN:     Patient will need to continue to follow up with you for their general medical care   As always, aggressive risk factor modification is strongly recommended.  We should

## 2018-11-12 ENCOUNTER — TELEPHONE (OUTPATIENT)
Dept: FAMILY MEDICINE CLINIC | Age: 73
End: 2018-11-12

## 2018-11-12 RX ORDER — MOMETASONE FUROATE 50 UG/1
SPRAY, METERED NASAL
Qty: 1 INHALER | Refills: 2 | Status: SHIPPED | OUTPATIENT
Start: 2018-11-12 | End: 2021-05-17

## 2018-11-20 ENCOUNTER — HOSPITAL ENCOUNTER (OUTPATIENT)
Dept: CARDIOLOGY | Age: 73
Discharge: HOME | End: 2018-11-20
Payer: MEDICARE

## 2018-11-20 PROCEDURE — 93296 REM INTERROG EVL PM/IDS: CPT

## 2018-11-21 RX ORDER — LISINOPRIL 5 MG/1
TABLET ORAL
Qty: 90 TABLET | Refills: 3 | Status: SHIPPED | OUTPATIENT
Start: 2018-11-21 | End: 2021-07-14

## 2018-11-21 NOTE — TELEPHONE ENCOUNTER
Pharmacy requesting refill. Medication pended for approval/denial. Thank you.     LOV  7/16/2018    NEXT APPT:  Future Appointments  Date Time Provider Department Center   1/17/2019 8:15 AM Mitch Bran MD MEDICAL [facility]   3/19/2019 10:15 AM Syed Turcios [facility]

## 2018-11-24 ENCOUNTER — CARE COORDINATION (OUTPATIENT)
Dept: CARE COORDINATION | Age: 73
End: 2018-11-24

## 2018-12-19 ENCOUNTER — CARE COORDINATION (OUTPATIENT)
Dept: CARE COORDINATION | Age: 73
End: 2018-12-19

## 2019-01-17 ENCOUNTER — CARE COORDINATION (OUTPATIENT)
Dept: CARE COORDINATION | Age: 74
End: 2019-01-17

## 2019-01-17 ENCOUNTER — OFFICE VISIT (OUTPATIENT)
Dept: FAMILY MEDICINE CLINIC | Age: 74
End: 2019-01-17
Payer: MEDICARE

## 2019-01-17 VITALS
BODY MASS INDEX: 39.17 KG/M2 | TEMPERATURE: 97.9 F | DIASTOLIC BLOOD PRESSURE: 62 MMHG | SYSTOLIC BLOOD PRESSURE: 96 MMHG | WEIGHT: 315 LBS | HEART RATE: 84 BPM | RESPIRATION RATE: 16 BRPM | HEIGHT: 75 IN

## 2019-01-17 DIAGNOSIS — M51.37 DDD (DEGENERATIVE DISC DISEASE), LUMBOSACRAL: Primary | ICD-10-CM

## 2019-01-17 DIAGNOSIS — E66.01 MORBID OBESITY WITH BMI OF 40.0-44.9, ADULT (HCC): ICD-10-CM

## 2019-01-17 DIAGNOSIS — I48.20 CHRONIC ATRIAL FIBRILLATION (HCC): ICD-10-CM

## 2019-01-17 DIAGNOSIS — I50.32 CHRONIC DIASTOLIC CONGESTIVE HEART FAILURE (HCC): ICD-10-CM

## 2019-01-17 DIAGNOSIS — I49.5 SSS (SICK SINUS SYNDROME) (HCC): ICD-10-CM

## 2019-01-17 DIAGNOSIS — C44.311 BASAL CELL CARCINOMA (BCC) OF SKIN OF NOSE: ICD-10-CM

## 2019-01-17 DIAGNOSIS — E66.01 MORBID OBESITY WITH BMI OF 45.0-49.9, ADULT (HCC): ICD-10-CM

## 2019-01-17 PROCEDURE — 99214 OFFICE O/P EST MOD 30 MIN: CPT | Performed by: FAMILY MEDICINE

## 2019-01-17 ASSESSMENT — ENCOUNTER SYMPTOMS
SINUS PRESSURE: 1
RHINORRHEA: 1
CHEST TIGHTNESS: 0
SHORTNESS OF BREATH: 0
BLOOD IN STOOL: 0
VOMITING: 0
NAUSEA: 0
WHEEZING: 0
ABDOMINAL PAIN: 0
BACK PAIN: 1
TROUBLE SWALLOWING: 0
SORE THROAT: 0
DIARRHEA: 0
COLOR CHANGE: 0
CONSTIPATION: 0
COUGH: 0

## 2019-02-19 ENCOUNTER — HOSPITAL ENCOUNTER (OUTPATIENT)
Dept: CARDIOLOGY | Age: 74
Discharge: HOME | End: 2019-02-19
Payer: MEDICARE

## 2019-02-19 PROCEDURE — 93280 PM DEVICE PROGR EVAL DUAL: CPT

## 2019-02-21 RX ORDER — APIXABAN 5 MG/1
5 TABLET, FILM COATED ORAL 2 TIMES DAILY
Qty: 180 TABLET | Refills: 3 | Status: SHIPPED | OUTPATIENT
Start: 2019-02-21 | End: 2019-09-24 | Stop reason: SDUPTHER

## 2019-02-26 ENCOUNTER — TELEPHONE (OUTPATIENT)
Dept: ADMINISTRATIVE | Age: 74
End: 2019-02-26

## 2019-02-26 ENCOUNTER — TELEPHONE (OUTPATIENT)
Dept: FAMILY MEDICINE CLINIC | Age: 74
End: 2019-02-26

## 2019-03-15 ENCOUNTER — OFFICE VISIT (OUTPATIENT)
Dept: FAMILY MEDICINE CLINIC | Age: 74
End: 2019-03-15
Payer: MEDICARE

## 2019-03-15 VITALS
BODY MASS INDEX: 39.17 KG/M2 | WEIGHT: 315 LBS | HEART RATE: 89 BPM | SYSTOLIC BLOOD PRESSURE: 118 MMHG | OXYGEN SATURATION: 97 % | RESPIRATION RATE: 18 BRPM | HEIGHT: 75 IN | DIASTOLIC BLOOD PRESSURE: 72 MMHG | TEMPERATURE: 98.1 F

## 2019-03-15 DIAGNOSIS — J20.9 ACUTE BRONCHITIS, UNSPECIFIED ORGANISM: ICD-10-CM

## 2019-03-15 DIAGNOSIS — J01.90 ACUTE NON-RECURRENT SINUSITIS, UNSPECIFIED LOCATION: Primary | ICD-10-CM

## 2019-03-15 PROCEDURE — G8510 SCR DEP NEG, NO PLAN REQD: HCPCS | Performed by: FAMILY MEDICINE

## 2019-03-15 PROCEDURE — 99213 OFFICE O/P EST LOW 20 MIN: CPT | Performed by: FAMILY MEDICINE

## 2019-03-15 RX ORDER — AMOXICILLIN AND CLAVULANATE POTASSIUM 875; 125 MG/1; MG/1
1 TABLET, FILM COATED ORAL 2 TIMES DAILY WITH MEALS
Qty: 20 TABLET | Refills: 0 | Status: SHIPPED | OUTPATIENT
Start: 2019-03-15 | End: 2019-03-25

## 2019-03-15 RX ORDER — FLUTICASONE PROPIONATE 50 MCG
1 SPRAY, SUSPENSION (ML) NASAL
COMMUNITY

## 2019-03-15 RX ORDER — BENZONATATE 200 MG/1
200 CAPSULE ORAL 3 TIMES DAILY PRN
Qty: 30 CAPSULE | Refills: 0 | Status: SHIPPED | OUTPATIENT
Start: 2019-03-15 | End: 2019-03-25

## 2019-03-15 RX ORDER — CETIRIZINE HYDROCHLORIDE 10 MG/1
20 TABLET ORAL
COMMUNITY
End: 2021-06-08

## 2019-03-15 ASSESSMENT — ENCOUNTER SYMPTOMS
RHINORRHEA: 0
GASTROINTESTINAL NEGATIVE: 1
SINUS PRESSURE: 0
EYE DISCHARGE: 0
DIARRHEA: 0
COUGH: 1
ABDOMINAL PAIN: 0
BLOOD IN STOOL: 0
NAUSEA: 0
EYES NEGATIVE: 1
SHORTNESS OF BREATH: 0
WHEEZING: 0
EYE PAIN: 0
SORE THROAT: 0
EYE REDNESS: 0
SINUS PAIN: 1
VOMITING: 0
ALLERGIC/IMMUNOLOGIC NEGATIVE: 1

## 2019-03-15 ASSESSMENT — PATIENT HEALTH QUESTIONNAIRE - PHQ9
SUM OF ALL RESPONSES TO PHQ9 QUESTIONS 1 & 2: 0
SUM OF ALL RESPONSES TO PHQ QUESTIONS 1-9: 0
SUM OF ALL RESPONSES TO PHQ QUESTIONS 1-9: 0
2. FEELING DOWN, DEPRESSED OR HOPELESS: 0
1. LITTLE INTEREST OR PLEASURE IN DOING THINGS: 0

## 2019-03-19 ENCOUNTER — CARE COORDINATION (OUTPATIENT)
Dept: CARE COORDINATION | Age: 74
End: 2019-03-19

## 2019-03-19 ENCOUNTER — OFFICE VISIT (OUTPATIENT)
Dept: CARDIOLOGY CLINIC | Age: 74
End: 2019-03-19
Payer: MEDICARE

## 2019-03-19 VITALS
HEIGHT: 75 IN | SYSTOLIC BLOOD PRESSURE: 110 MMHG | HEART RATE: 84 BPM | BODY MASS INDEX: 39.17 KG/M2 | WEIGHT: 315 LBS | DIASTOLIC BLOOD PRESSURE: 70 MMHG

## 2019-03-19 DIAGNOSIS — I50.32 CHRONIC DIASTOLIC CONGESTIVE HEART FAILURE (HCC): ICD-10-CM

## 2019-03-19 DIAGNOSIS — I49.5 SSS (SICK SINUS SYNDROME) (HCC): ICD-10-CM

## 2019-03-19 DIAGNOSIS — G47.33 OSA (OBSTRUCTIVE SLEEP APNEA): ICD-10-CM

## 2019-03-19 DIAGNOSIS — I48.20 CHRONIC ATRIAL FIBRILLATION (HCC): Primary | ICD-10-CM

## 2019-03-19 DIAGNOSIS — E66.01 MORBID OBESITY DUE TO EXCESS CALORIES (HCC): ICD-10-CM

## 2019-03-19 PROCEDURE — 99214 OFFICE O/P EST MOD 30 MIN: CPT | Performed by: INTERNAL MEDICINE

## 2019-03-19 PROCEDURE — 93000 ELECTROCARDIOGRAM COMPLETE: CPT | Performed by: INTERNAL MEDICINE

## 2019-03-21 ENCOUNTER — CARE COORDINATION (OUTPATIENT)
Dept: CARE COORDINATION | Age: 74
End: 2019-03-21

## 2019-03-22 RX ORDER — AZITHROMYCIN 250 MG/1
TABLET, FILM COATED ORAL
Qty: 1 PACKET | Refills: 0 | Status: SHIPPED | OUTPATIENT
Start: 2019-03-22 | End: 2019-04-01

## 2019-05-20 RX ORDER — SOTALOL HYDROCHLORIDE 120 MG/1
TABLET ORAL
Qty: 180 TABLET | Refills: 1 | Status: SHIPPED | OUTPATIENT
Start: 2019-05-20 | End: 2019-08-20 | Stop reason: SDUPTHER

## 2019-05-21 ENCOUNTER — HOSPITAL ENCOUNTER (OUTPATIENT)
Dept: CARDIOLOGY | Age: 74
Discharge: HOME | End: 2019-05-21
Payer: MEDICARE

## 2019-05-21 PROCEDURE — 93296 REM INTERROG EVL PM/IDS: CPT

## 2019-08-20 ENCOUNTER — HOSPITAL ENCOUNTER (OUTPATIENT)
Dept: CARDIOLOGY | Age: 74
Discharge: HOME | End: 2019-08-20
Payer: MEDICARE

## 2019-08-20 PROCEDURE — 93280 PM DEVICE PROGR EVAL DUAL: CPT

## 2019-08-22 RX ORDER — SOTALOL HYDROCHLORIDE 120 MG/1
TABLET ORAL
Qty: 180 TABLET | Refills: 1 | Status: SHIPPED | OUTPATIENT
Start: 2019-08-22 | End: 2020-05-13

## 2019-09-24 ENCOUNTER — OFFICE VISIT (OUTPATIENT)
Dept: CARDIOLOGY CLINIC | Age: 74
End: 2019-09-24
Payer: MEDICARE

## 2019-09-24 VITALS
HEIGHT: 75 IN | DIASTOLIC BLOOD PRESSURE: 70 MMHG | SYSTOLIC BLOOD PRESSURE: 110 MMHG | WEIGHT: 257 LBS | BODY MASS INDEX: 31.95 KG/M2 | HEART RATE: 78 BPM

## 2019-09-24 DIAGNOSIS — E66.01 MORBID OBESITY WITH BMI OF 45.0-49.9, ADULT (HCC): ICD-10-CM

## 2019-09-24 DIAGNOSIS — E66.01 MORBID OBESITY DUE TO EXCESS CALORIES (HCC): ICD-10-CM

## 2019-09-24 DIAGNOSIS — I49.5 SSS (SICK SINUS SYNDROME) (HCC): ICD-10-CM

## 2019-09-24 DIAGNOSIS — I48.20 CHRONIC ATRIAL FIBRILLATION (HCC): Primary | ICD-10-CM

## 2019-09-24 DIAGNOSIS — G47.33 OSA (OBSTRUCTIVE SLEEP APNEA): ICD-10-CM

## 2019-09-24 DIAGNOSIS — Z95.0 S/P CARDIAC PACEMAKER PROCEDURE: ICD-10-CM

## 2019-09-24 PROCEDURE — 93000 ELECTROCARDIOGRAM COMPLETE: CPT | Performed by: INTERNAL MEDICINE

## 2019-09-24 PROCEDURE — 99214 OFFICE O/P EST MOD 30 MIN: CPT | Performed by: INTERNAL MEDICINE

## 2019-09-24 NOTE — PROGRESS NOTES
BP and hr are good. CAD is stable. No LE discoloration or ulcers. No LE edema. No CHF type symptoms. Lipid profile is normal. No recent hospitalization. No change in meds. Compliant with CPAP. Remains on sotalol and Eliquis. EKG with NSR, normal QTC. S/p PPm check which was good. LDL is 98. Hx of mild CAD per [facility]. 9-24-19: doing well. Pt denies chest pain, dyspnea, dyspnea on exertion, change in exercise capacity, fatigue,  nausea, vomiting, diarrhea, constipation, motor weakness, insomnia, weight loss, syncope, dizziness, lightheadedness, palpitations, PND, orthopnea, or claudication. No nitro use. BP and hr are good. CAD is stable. No LE discoloration or ulcers. No LE edema. No CHF type symptoms. Lipid profile is normal. No recent hospitalization. No change in meds. S/p PPM check with less than 0.1% in Af, longest episode was 1min in 5/19. EKG with NSR, normal QTC. On Sotalol and DOAC. No bleeding. + CPAP qhs. On lasix taking half tablet of 40mg daily.    Patient Active Problem List   Diagnosis    LVH (left ventricular hypertrophy)    Seasonal allergic rhinitis    DDD (degenerative disc disease), lumbosacral    Abnormal EKG    Morbid obesity with BMI of 45.0-49.9, adult (HCC)    Chronic diastolic congestive heart failure (HCC)    Chronic atrial fibrillation (HCC)    SSS (sick sinus syndrome) ([address]    S/P cardiac pacemaker procedure    MORGAN (obstructive sleep apnea)    Morbid obesity due to excess calories ([address]    Basal cell carcinoma (BCC) of skin of nose       Past Surgical History:   Procedure Laterality Date    CARDIAC CATHETERIZATION  5/05    CATARACT REMOVAL  12/2012    both eyes ccf in [place] done by     COLONOSCOPY  11/6/09    DR PLAZA    PACEMAKER INSERTION      TOTAL KNEE ARTHROPLASTY  3/07    RT    TOTAL KNEE ARTHROPLASTY Left 11/15/2017    Dr. Pizarro Ends History     Socioeconomic History    Marital status:      Spouse name: Not on file    Number of children: Not on file    Years of education: Not on file    Highest education level: Not on file   Occupational History    Not on file   Social Needs    Financial resource strain: Not on file    Food insecurity:     Worry: Not on file     Inability: Not on file    Transportation needs:     Medical: Not on file     Non-medical: Not on file   Tobacco Use    Smoking status: Never Smoker    Smokeless tobacco: Never Used   Substance and Sexual Activity    Alcohol use: Yes     Comment: OCC    Drug use: No    Sexual activity: Not on file   Lifestyle    Physical activity:     Days per week: Not on file     Minutes per session: Not on file    Stress: Not on file   Relationships    Social connections:     Talks on phone: Not on file     Gets together: Not on file     Attends Caodaism service: Not on file     Active member of club or organization: Not on file     Attends meetings of clubs or organizations: Not on file     Relationship status: Not on file    Intimate partner violence:     Fear of current or ex partner: Not on file     Emotionally abused: Not on file     Physically abused: Not on file     Forced sexual activity: Not on file   Other Topics Concern    Not on file   Social History Narrative    Not on file       Family History   Problem Relation Age of Onset    Asthma Mother     High Blood Pressure Father     Cancer Father     Ataxia Father     Other Father         CEREBRAL HEMMORHAGE    Other Sister         CLL    Other Other         AAA       Current Outpatient Medications   Medication Sig Dispense Refill    sotalol (BETAPACE) 120 MG tablet TAKE ONE TABLET BY MOUTH TWICE DAILY 180 tablet 1    cetirizine (ZYRTEC ALLERGY) 10 MG tablet Take 20 mg by mouth      fluticasone (FLONASE) 50 MCG/ACT nasal spray 1 spray by NOT APPLICABLE route      ELIQUIS 5 MG TABS tablet TAKE 1 TABLET BY MOUTH 2 TIMES DAILY 180 tablet 3    lisinopril (PRINIVIL;ZESTRIL) 5 MG tablet TAKE 1 TABLET BY MOUTH DAILY fibrillation (HCC)  EKG 12 Lead   2. SSS (sick sinus syndrome) ([facility]     3. S/P cardiac pacemaker procedure     4. MORGAN (obstructive sleep apnea)     5. Morbid obesity with BMI of 45.0-49.9, adult ([facility]     6. Morbid obesity due to excess calories [facility])           PLAN:     Patient will need to continue to follow up with you for their general medical care   As always, aggressive risk factor modification is strongly recommended. We should adhere to the [address] guidelines for HTN management and the NCEP ATP III guidelines for LDL-C management. Cardiac diet is always recommended with low fat, cholesterol, calories and sodium. Continue medications at current doses. Patient was advised and encouraged to check blood pressure at home or at a pharmacy, maintain a logbook, and also call us back if blood pressure are above the target ranges or if it is low. Patient clearly understands and agrees to the instructions. We will need to continue to monitor muscle and liver enzymes, BUN, CR, and electrolytes. Weight loss discussed. CPAP everynight    Continue with sotalol 120mg BID, OAC    Check EKG    Follow up with Pacer clinic. Patient is to avoid any excessive caffeine, chocolate, or OTC stimulants. Thank you for allowing me to participate in the care of your patient, please don't hesitate to contact me if you have any further questions.

## 2020-02-19 ENCOUNTER — HOSPITAL ENCOUNTER (OUTPATIENT)
Dept: CARDIOLOGY | Age: 75
Discharge: HOME | End: 2020-02-19
Payer: MEDICARE

## 2020-02-19 PROCEDURE — 93280 PM DEVICE PROGR EVAL DUAL: CPT

## 2020-05-13 RX ORDER — SOTALOL HYDROCHLORIDE 120 MG/1
TABLET ORAL
Qty: 180 TABLET | Refills: 0 | Status: SHIPPED | OUTPATIENT
Start: 2020-05-13 | End: 2020-08-10

## 2020-05-20 ENCOUNTER — HOSPITAL ENCOUNTER (OUTPATIENT)
Dept: CARDIOLOGY | Age: 75
Discharge: HOME | End: 2020-05-20
Payer: MEDICARE

## 2020-05-20 PROCEDURE — 93296 REM INTERROG EVL PM/IDS: CPT

## 2020-08-04 ENCOUNTER — OFFICE VISIT (OUTPATIENT)
Dept: CARDIOLOGY CLINIC | Age: 75
End: 2020-08-04
Payer: MEDICARE

## 2020-08-04 VITALS
HEART RATE: 84 BPM | WEIGHT: 315 LBS | SYSTOLIC BLOOD PRESSURE: 120 MMHG | BODY MASS INDEX: 44.75 KG/M2 | DIASTOLIC BLOOD PRESSURE: 80 MMHG

## 2020-08-04 PROBLEM — I48.0 PAROXYSMAL ATRIAL FIBRILLATION (HCC): Status: ACTIVE | Noted: 2020-08-04

## 2020-08-04 PROBLEM — I48.20 CHRONIC ATRIAL FIBRILLATION (HCC): Status: RESOLVED | Noted: 2017-01-17 | Resolved: 2020-08-04

## 2020-08-04 PROCEDURE — 99214 OFFICE O/P EST MOD 30 MIN: CPT | Performed by: INTERNAL MEDICINE

## 2020-08-04 PROCEDURE — 93000 ELECTROCARDIOGRAM COMPLETE: CPT | Performed by: INTERNAL MEDICINE

## 2020-08-04 NOTE — PROGRESS NOTES
Chief Complaint   Patient presents with    Atrial Fibrillation    6 Month Follow-Up       12-8-16: Patient presents for initial medical evaluation. Patient is followed on a regular basis by Dr. Zuleyka Vo MD. Was in [place] with a lung infection a year ago. States his Echo with EF of 45%, but per recent echo in 4/2016 with EF of 50%, mod PHTN, mild MR, grade II DD, biatrial enlargement. He did have a LHC in [place] and had mild CAD per patient, no PCI was performed. Pt denies chest pain, dyspnea, dyspnea on exertion, change in exercise capacity, fatigue,  nausea, vomiting, diarrhea, constipation, motor weakness, insomnia, weight loss, syncope, dizziness, lightheadedness, palpitations, PND, orthopnea, or claudication. Was noted to have new onset afibb by pcp and placed on Eliquis. He does have MORGAN and compliant with CPAP machine. 5-2-17: s/p sotalol loading at [facility] and . EKG today shows Afibb with CVR, normal QTC. did have PNA down in [place]. S/p event monitors in [place] and b showing 2 sec pauses, HR in 40's at times, mostly in 50-60's, some episodes into 130-150's. Compliant with CPAP machine nightly. States he is fatigued and sleeps more than he usually does. Does need knee replacement surgery in 9/2017. Pt denies chest pain,   nausea, vomiting, diarrhea, constipation, motor weakness, insomnia, weight loss, syncope, dizziness, lightheadedness, palpitations, PND, orthopnea. 7-11-17: s/p dual chamber PPM placement at [facility] in 6/2017. States he feels good overall. Pacemaker site is healing well. Pt denies chest pain, dyspnea, dyspnea on exertion, change in exercise capacity, fatigue,  nausea, vomiting, diarrhea, constipation, motor weakness, insomnia, weight loss, syncope, dizziness, lightheadedness, palpitations, PND, orthopnea, or claudication. On Sotalol and Eliquis    9-19-17: s/p CV at [facility]. EKG with Apaced rhythm. QTC is 441ms. He is feeling good. On [address] and no bleeding issues.  States he has more energy now as well. Pt denies chest pain, dyspnea, dyspnea on exertion, change in exercise capacity, fatigue,  nausea, vomiting, diarrhea, constipation, motor weakness, insomnia, weight loss, syncope, dizziness, lightheadedness, palpitations, PND, orthopnea, or claudication. Does have some URI type symptoms now. Wants to get left knee operated on. Not able to lose weight. Compliant with CPAP.       3-20-18: s/p knee surgery and did well. s/p PPM check and was fine. Breathing ok. Pt denies chest pain, dyspnea, dyspnea on exertion, change in exercise capacity, fatigue,  nausea, vomiting, diarrhea, constipation, motor weakness, insomnia, weight loss, syncope, dizziness, lightheadedness, palpitations, PND, orthopnea, or claudication. Using a wiley to ambulate. No nitro use. BP and hr are good. CAD is stable. No LE discoloration or ulcers. No LE edema. No CHF type symptoms. Lipid profile is normal. EKG with first degree AVB, normal QTC. Remains on sotalol and Eliquis, no bleeding issues. 9-18-18: Pt denies chest pain, dyspnea, dyspnea on exertion, change in exercise capacity, fatigue,  nausea, vomiting, diarrhea, constipation, motor weakness, insomnia, weight loss, syncope, dizziness, lightheadedness, palpitations, PND, orthopnea, or claudication. No nitro use. BP and hr are good. CAD is stable. No LE discoloration or ulcers. No LE edema. No CHF type symptoms. Lipid profile is normal. No recent hospitalization. No change in meds. Remains on sotalol 120mg BID and NOAC. No bleeding issues. EKG with first degree AVB, normal QTC. States he is active in his yard. LDL is 98. Compliant with CPAP. Not losing weight. 3-19-19: has URI type symptoms. Pt denies chest pain, dyspnea, dyspnea on exertion, change in exercise capacity, fatigue,  nausea, vomiting, diarrhea, constipation, motor weakness, insomnia, weight loss, syncope, dizziness, lightheadedness, palpitations, PND, orthopnea, or claudication. No nitro use. BP and hr are good. CAD is stable. No LE discoloration or ulcers. No LE edema. No CHF type symptoms. Lipid profile is normal. No recent hospitalization. No change in meds. Compliant with CPAP. Remains on sotalol and Eliquis. EKG with NSR, normal QTC. S/p PPm check which was good. LDL is 98. Hx of mild CAD per [facility]. 9-24-19: doing well. Pt denies chest pain, dyspnea, dyspnea on exertion, change in exercise capacity, fatigue,  nausea, vomiting, diarrhea, constipation, motor weakness, insomnia, weight loss, syncope, dizziness, lightheadedness, palpitations, PND, orthopnea, or claudication. No nitro use. BP and hr are good. CAD is stable. No LE discoloration or ulcers. No LE edema. No CHF type symptoms. Lipid profile is normal. No recent hospitalization. No change in meds. S/p PPM check with less than 0.1% in Af, longest episode was 1min in 5/19. EKG with NSR, normal QTC. On Sotalol and DOAC. No bleeding. + CPAP qhs. On lasix taking half tablet of 40mg daily. 8-4-20: hx of Pafib. On sotalol and DOAC. Pt denies chest pain, dyspnea, dyspnea on exertion, change in exercise capacity, fatigue,  nausea, vomiting, diarrhea, constipation, motor weakness, insomnia, weight loss, syncope, dizziness, lightheadedness, palpitations, PND, orthopnea, or claudication. No nitro use. BP and hr are good. CAD is stable. No LE discoloration or ulcers. No LE edema. No CHF type symptoms. Lipid profile is normal. No recent hospitalization. No change in meds. No bleeding issues. Hx of MORGAN on CPAP. Remain on lasix 20mg daily. Hx of SSS s/p PPM. S/p check in 2/2020 with very brief VT. EKG with first degree AVB.        Patient Active Problem List   Diagnosis    LVH (left ventricular hypertrophy)    Seasonal allergic rhinitis    DDD (degenerative disc disease), lumbosacral    Abnormal EKG    Morbid obesity with BMI of 45.0-49.9, adult ([facility]    Chronic diastolic congestive heart failure (HCC)    SSS (sick sinus syndrome) ([facility]  S/P cardiac pacemaker procedure    MORGAN (obstructive sleep apnea)    Morbid obesity due to excess calories (HCC)    Basal cell carcinoma (BCC) of skin of nose    Paroxysmal atrial fibrillation [facility])       Past Surgical History:   Procedure Laterality Date    CARDIAC CATHETERIZATION  5/05    CATARACT REMOVAL  12/2012    both eyes ccf in [place] done by     COLONOSCOPY  11/6/09    DR PLAZA    PACEMAKER INSERTION      TOTAL KNEE ARTHROPLASTY  3/07    RT    TOTAL KNEE ARTHROPLASTY Left 11/15/2017    Dr. Carlos Duran History     Socioeconomic History    Marital status:       Spouse name: Not on file    Number of children: Not on file    Years of education: Not on file    Highest education level: Not on file   Occupational History    Not on file   Social Needs    Financial resource strain: Not on file    Food insecurity     Worry: Not on file     Inability: Not on file    Transportation needs     Medical: Not on file     Non-medical: Not on file   Tobacco Use    Smoking status: Never Smoker    Smokeless tobacco: Never Used   Substance and Sexual Activity    Alcohol use: Yes     Comment: OCC    Drug use: No    Sexual activity: Not on file   Lifestyle    Physical activity     Days per week: Not on file     Minutes per session: Not on file    Stress: Not on file   Relationships    Social connections     Talks on phone: Not on file     Gets together: Not on file     Attends Mu-ism service: Not on file     Active member of club or organization: Not on file     Attends meetings of clubs or organizations: Not on file     Relationship status: Not on file    Intimate partner violence     Fear of current or ex partner: Not on file     Emotionally abused: Not on file     Physically abused: Not on file     Forced sexual activity: Not on file   Other Topics Concern    Not on file   Social History Narrative    Not on file       Family History   Problem Relation Age of Onset    Asthma Mother     High Blood Pressure Father     Cancer Father     Ataxia Father     Other Father         CEREBRAL HEMMORHAGE    Other Sister         CLL    Other Other         AAA       Current Outpatient Medications   Medication Sig Dispense Refill    apixaban (ELIQUIS) 5 MG TABS tablet Take 1 tablet by mouth 2 times daily 70 tablet 0    sotalol (BETAPACE) 120 MG tablet TAKE ONE TABLET BY MOUTH TWICE DAILY 180 tablet 0    cetirizine (ZYRTEC ALLERGY) 10 MG tablet Take 20 mg by mouth      fluticasone (FLONASE) 50 MCG/ACT nasal spray 1 spray by NOT APPLICABLE route      lisinopril (PRINIVIL;ZESTRIL) 5 MG tablet TAKE 1 TABLET BY MOUTH DAILY 90 tablet 3    mometasone (NASONEX) 50 MCG/ACT nasal spray 2 sprays each nostril daily. 1 Inhaler 2    Nutritional Supplements (JUICE PLUS FIBRE PO) Take by mouth      furosemide (LASIX) 40 MG tablet TAKE 1 TABLET BY MOUTH DAILY 90 tablet 3    diphenhydrAMINE (ALLERGY) 25 MG capsule Take 25 mg by mouth 2 times daily       No current facility-administered medications for this visit. Seasonal    Review of Systems:  General ROS: negative  Psychological ROS: negative  Hematological and Lymphatic ROS: No history of blood clots or bleeding disorder. Respiratory ROS: no cough, shortness of breath, or wheezing  Cardiovascular ROS: no chest pain or dyspnea on exertion  Gastrointestinal ROS: no abdominal pain, change in bowel habits, or black or bloody stools  Genito-Urinary ROS: no dysuria, trouble voiding, or hematuria  Musculoskeletal ROS: negative  Neurological ROS: negative  Dermatological ROS: negative    VITALS:  Blood pressure 120/80, pulse 84, weight (!) 358 lb (162.4 kg). Body mass index is 44.75 kg/m². Physical Examination:  General appearance - alert, well appearing, and in no distress and overweight  Mental status - alert, oriented to person, place, and time  Neck - Neck is supple, no JVD or carotid bruits. No thyromegaly or adenopathy.    Chest - clear to auscultation, no wheezes, rales or rhonchi, symmetric air entry  Heart - normal rate, irregular rhythm, normal S1, S2, no murmurs, rubs, clicks or gallops  Abdomen - soft, nontender, nondistended, no masses or organomegaly  Neurological - alert, oriented, normal speech, no focal findings or movement disorder noted  Extremities - peripheral pulses normal, no pedal edema, no clubbing or cyanosis  Skin - normal coloration and turgor, no rashes, no suspicious skin lesions noted    Pacemaker site looks good. Orders Placed This Encounter   Procedures    EKG 12 Lead       ASSESSMENT:     Diagnosis Orders   1. Chronic atrial fibrillation  EKG 12 Lead   2. Chronic diastolic congestive heart failure ([address]     3. SSS (sick sinus syndrome) ([address]     4. Morbid obesity with BMI of 45.0-49.9, adult ([address]     5. Morbid obesity due to excess calories ([facility]     6. S/P cardiac pacemaker procedure     7. MORGAN (obstructive sleep apnea)     8. Paroxysmal atrial fibrillation (HCC)           PLAN:     Patient will need to continue to follow up with you for their general medical care   As always, aggressive risk factor modification is strongly recommended. We should adhere to the [address] guidelines for HTN management and the NCEP ATP III guidelines for LDL-C management. Cardiac diet is always recommended with low fat, cholesterol, calories and sodium. Continue medications at current doses. Patient was advised and encouraged to check blood pressure at home or at a pharmacy, maintain a logbook, and also call us back if blood pressure are above the target ranges or if it is low. Patient clearly understands and agrees to the instructions. We will need to continue to monitor muscle and liver enzymes, BUN, CR, and electrolytes. Weight loss discussed. CPAP everynight    Continue with sotalol 120mg BID, DOAC    Check EKG    Follow up with Pac[facility] clinic.      Patient is to avoid any excessive caffeine, chocolate, or

## 2020-08-10 RX ORDER — SOTALOL HYDROCHLORIDE 120 MG/1
TABLET ORAL
Qty: 180 TABLET | Refills: 2 | Status: SHIPPED | OUTPATIENT
Start: 2020-08-10 | End: 2021-03-03

## 2020-08-10 NOTE — TELEPHONE ENCOUNTER
requesting medication refill.  Please approve or deny this request.    Rx requested:  Requested Prescriptions     Pending Prescriptions Disp Refills    sotalol (BETAPACE) 120 MG tablet [Pharmacy Med Name: SOTALOL 120MG TABLET] 180 tablet 0     Sig: TAKE ONE TABLET BY MOUTH TWICE DAILY         Last Office Visit:   8/4/2020      Next Visit Date:  Future Appointments   Date Time Provider Scott Ahn   8/19/2020  9:00 Jovanni [address]   2/16/2021 12:15 PM Syed Angulo, DO [address]

## 2020-08-19 ENCOUNTER — HOSPITAL ENCOUNTER (OUTPATIENT)
Dept: CARDIOLOGY | Age: 75
Discharge: HOME | End: 2020-08-19
Payer: MEDICARE

## 2020-08-19 PROCEDURE — 93296 REM INTERROG EVL PM/IDS: CPT

## 2020-11-17 ENCOUNTER — HOSPITAL ENCOUNTER (OUTPATIENT)
Dept: CARDIOLOGY | Age: 75
Discharge: HOME | End: 2020-11-17
Payer: MEDICARE

## 2020-11-17 PROCEDURE — 93280 PM DEVICE PROGR EVAL DUAL: CPT

## 2021-01-01 ENCOUNTER — HOSPITAL ENCOUNTER (OUTPATIENT)
Dept: DATA CONVERSION | Facility: HOSPITAL | Age: 76
Discharge: HOME | End: 2021-04-13
Attending: PLASTIC SURGERY | Admitting: PLASTIC SURGERY

## 2021-01-01 DIAGNOSIS — L98.8 OTHER SPECIFIED DISORDERS OF THE SKIN AND SUBCUTANEOUS TISSUE: ICD-10-CM

## 2021-01-01 DIAGNOSIS — C44.329 SQUAMOUS CELL CARCINOMA OF SKIN OF OTHER PARTS OF FACE: ICD-10-CM

## 2021-02-16 ENCOUNTER — OFFICE VISIT (OUTPATIENT)
Dept: CARDIOLOGY CLINIC | Age: 76
End: 2021-02-16
Payer: MEDICARE

## 2021-02-16 ENCOUNTER — HOSPITAL ENCOUNTER (OUTPATIENT)
Dept: CARDIOLOGY | Age: 76
Discharge: HOME | End: 2021-02-16
Payer: MEDICARE

## 2021-02-16 VITALS
DIASTOLIC BLOOD PRESSURE: 70 MMHG | HEART RATE: 76 BPM | RESPIRATION RATE: 16 BRPM | TEMPERATURE: 97.2 F | BODY MASS INDEX: 44.55 KG/M2 | WEIGHT: 315 LBS | OXYGEN SATURATION: 99 % | SYSTOLIC BLOOD PRESSURE: 118 MMHG

## 2021-02-16 DIAGNOSIS — I50.32 CHRONIC DIASTOLIC CONGESTIVE HEART FAILURE (HCC): ICD-10-CM

## 2021-02-16 DIAGNOSIS — E66.01 MORBID OBESITY DUE TO EXCESS CALORIES (HCC): ICD-10-CM

## 2021-02-16 DIAGNOSIS — Z95.0 S/P CARDIAC PACEMAKER PROCEDURE: ICD-10-CM

## 2021-02-16 DIAGNOSIS — G47.33 OSA (OBSTRUCTIVE SLEEP APNEA): ICD-10-CM

## 2021-02-16 DIAGNOSIS — R94.31 ABNORMAL EKG: ICD-10-CM

## 2021-02-16 DIAGNOSIS — I49.5 SSS (SICK SINUS SYNDROME) (HCC): Primary | ICD-10-CM

## 2021-02-16 DIAGNOSIS — I48.0 PAROXYSMAL ATRIAL FIBRILLATION (HCC): ICD-10-CM

## 2021-02-16 DIAGNOSIS — E66.01 MORBID OBESITY WITH BMI OF 45.0-49.9, ADULT (HCC): ICD-10-CM

## 2021-02-16 PROCEDURE — 93296 REM INTERROG EVL PM/IDS: CPT

## 2021-02-16 PROCEDURE — 93000 ELECTROCARDIOGRAM COMPLETE: CPT | Performed by: INTERNAL MEDICINE

## 2021-02-16 PROCEDURE — 99214 OFFICE O/P EST MOD 30 MIN: CPT | Performed by: INTERNAL MEDICINE

## 2021-02-16 NOTE — PROGRESS NOTES
Chief Complaint   Patient presents with    6 Month Follow-Up    Atrial Fibrillation    Irregular Heart Beat     SSS       12-8-16: Patient presents for initial medical evaluation. Patient is followed on a regular basis by Dr. Ninfa Serna MD. Was in [place] with a lung infection a year ago. States his Echo with EF of 45%, but per recent echo in 4/2016 with EF of 50%, mod PHTN, mild MR, grade II DD, biatrial enlargement. He did have a LHC in [place] and had mild CAD per patient, no PCI was performed. Pt denies chest pain, dyspnea, dyspnea on exertion, change in exercise capacity, fatigue,  nausea, vomiting, diarrhea, constipation, motor weakness, insomnia, weight loss, syncope, dizziness, lightheadedness, palpitations, PND, orthopnea, or claudication. Was noted to have new onset afibb by pcp and placed on Eliquis. He does have MORGAN and compliant with CPAP machine. 5-2-17: s/p sotalol loading at [facility] and CV. EKG today shows Afibb with CVR, normal QTC. did have PNA down in [place]. S/p event monitors in [place] and b showing 2 sec pauses, HR in 40's at times, mostly in 50-60's, some episodes into 130-150's. Compliant with CPAP machine nightly. States he is fatigued and sleeps more than he usually does. Does need knee replacement surgery in 9/2017. Pt denies chest pain,   nausea, vomiting, diarrhea, constipation, motor weakness, insomnia, weight loss, syncope, dizziness, lightheadedness, palpitations, PND, orthopnea. 7-11-17: s/p dual chamber PPM placement at [facility] in 6/2017. States he feels good overall. Pacemaker site is healing well. Pt denies chest pain, dyspnea, dyspnea on exertion, change in exercise capacity, fatigue,  nausea, vomiting, diarrhea, constipation, motor weakness, insomnia, weight loss, syncope, dizziness, lightheadedness, palpitations, PND, orthopnea, or claudication. On Sotalol and Eliquis    9-19-17: s/p CV at [facility]. EKG with Apaced rhythm. QTC is 441ms. He is feeling good.  On [address] and no bleeding issues. States he has more energy now as well. Pt denies chest pain, dyspnea, dyspnea on exertion, change in exercise capacity, fatigue,  nausea, vomiting, diarrhea, constipation, motor weakness, insomnia, weight loss, syncope, dizziness, lightheadedness, palpitations, PND, orthopnea, or claudication. Does have some URI type symptoms now. Wants to get left knee operated on. Not able to lose weight. Compliant with CPAP.       3-20-18: s/p knee surgery and did well. s/p PPM check and was fine. Breathing ok. Pt denies chest pain, dyspnea, dyspnea on exertion, change in exercise capacity, fatigue,  nausea, vomiting, diarrhea, constipation, motor weakness, insomnia, weight loss, syncope, dizziness, lightheadedness, palpitations, PND, orthopnea, or claudication. Using a wiley to ambulate. No nitro use. BP and hr are good. CAD is stable. No LE discoloration or ulcers. No LE edema. No CHF type symptoms. Lipid profile is normal. EKG with first degree AVB, normal QTC. Remains on sotalol and Eliquis, no bleeding issues. 9-18-18: Pt denies chest pain, dyspnea, dyspnea on exertion, change in exercise capacity, fatigue,  nausea, vomiting, diarrhea, constipation, motor weakness, insomnia, weight loss, syncope, dizziness, lightheadedness, palpitations, PND, orthopnea, or claudication. No nitro use. BP and hr are good. CAD is stable. No LE discoloration or ulcers. No LE edema. No CHF type symptoms. Lipid profile is normal. No recent hospitalization. No change in meds. Remains on sotalol 120mg BID and NOAC. No bleeding issues. EKG with first degree AVB, normal QTC. States he is active in his yard. LDL is 98. Compliant with CPAP. Not losing weight. 3-19-19: has URI type symptoms.  Pt denies chest pain, dyspnea, dyspnea on exertion, change in exercise capacity, fatigue,  nausea, vomiting, diarrhea, constipation, motor weakness, insomnia, weight loss, syncope, dizziness, lightheadedness, palpitations, PND, orthopnea, or claudication. No nitro use. BP and hr are good. CAD is stable. No LE discoloration or ulcers. No LE edema. No CHF type symptoms. Lipid profile is normal. No recent hospitalization. No change in meds. Compliant with CPAP. Remains on sotalol and Eliquis. EKG with NSR, normal QTC. S/p PPm check which was good. LDL is 98. Hx of mild CAD per [facility]. 9-24-19: doing well. Pt denies chest pain, dyspnea, dyspnea on exertion, change in exercise capacity, fatigue,  nausea, vomiting, diarrhea, constipation, motor weakness, insomnia, weight loss, syncope, dizziness, lightheadedness, palpitations, PND, orthopnea, or claudication. No nitro use. BP and hr are good. CAD is stable. No LE discoloration or ulcers. No LE edema. No CHF type symptoms. Lipid profile is normal. No recent hospitalization. No change in meds. S/p PPM check with less than 0.1% in Af, longest episode was 1min in 5/19. EKG with NSR, normal QTC. On Sotalol and DOAC. No bleeding. + CPAP qhs. On lasix taking half tablet of 40mg daily. 8-4-20: hx of Pafib. On sotalol and DOAC. Pt denies chest pain, dyspnea, dyspnea on exertion, change in exercise capacity, fatigue,  nausea, vomiting, diarrhea, constipation, motor weakness, insomnia, weight loss, syncope, dizziness, lightheadedness, palpitations, PND, orthopnea, or claudication. No nitro use. BP and hr are good. CAD is stable. No LE discoloration or ulcers. No LE edema. No CHF type symptoms. Lipid profile is normal. No recent hospitalization. No change in meds. No bleeding issues. Hx of MORGAN on CPAP. Remain on lasix 20mg daily. Hx of SSS s/p PPM. S/p check in 2/2020 with very brief VT. EKG with first degree AVB. 2-16-21: Doing ok overall. hx of Pafib. On sotalol and DOAC. Was shoveling snow toady and did ok.  Pt denies chest pain, dyspnea, change in exercise capacity, fatigue,  nausea, vomiting, diarrhea, constipation, motor weakness, insomnia, weight loss, syncope, dizziness, lightheadedness, organization: None     Attends meetings of clubs or organizations: None     Relationship status: None    Intimate partner violence     Fear of current or ex partner: None     Emotionally abused: None     Physically abused: None     Forced sexual activity: None   Other Topics Concern    None   Social History Narrative    None       Family History   Problem Relation Age of Onset    Asthma Mother     High Blood Pressure Father     Cancer Father     Ataxia Father     Other Father         CEREBRAL HEMMORHAGE    Other Sister         CLL    Other Other         AAA       Current Outpatient Medications   Medication Sig Dispense Refill    sotalol (BETAPACE) 120 MG tablet TAKE ONE TABLET BY MOUTH TWICE DAILY 180 tablet 2    apixaban (ELIQUIS) 5 MG TABS tablet Take 1 tablet by mouth 2 times daily 70 tablet 0    cetirizine (ZYRTEC ALLERGY) 10 MG tablet Take 20 mg by mouth      fluticasone (FLONASE) 50 MCG/ACT nasal spray 1 spray by NOT APPLICABLE route      lisinopril (PRINIVIL;ZESTRIL) 5 MG tablet TAKE 1 TABLET BY MOUTH DAILY 90 tablet 3    mometasone (NASONEX) 50 MCG/ACT nasal spray 2 sprays each nostril daily. 1 Inhaler 2    Nutritional Supplements (JUICE PLUS FIBRE PO) Take by mouth      furosemide (LASIX) 40 MG tablet TAKE 1 TABLET BY MOUTH DAILY 90 tablet 3    diphenhydrAMINE (ALLERGY) 25 MG capsule Take 25 mg by mouth 2 times daily       No current facility-administered medications for this visit. Seasonal    Review of Systems:  General ROS: negative  Psychological ROS: negative  Hematological and Lymphatic ROS: No history of blood clots or bleeding disorder.    Respiratory ROS: no cough, shortness of breath, or wheezing  Cardiovascular ROS: no chest pain or dyspnea on exertion  Gastrointestinal ROS: no abdominal pain, change in bowel habits, or black or bloody stools  Genito-Urinary ROS: no dysuria, trouble voiding, or hematuria  Musculoskeletal ROS: negative  Neurological ROS: negative  Dermatological ROS: negative    VITALS:  Blood pressure 118/70, pulse 76, temperature 97.2 °F (36.2 °C), temperature source Infrared, resp. rate 16, weight (!) 356 lb 6.4 oz (161.7 kg), SpO2 99 %. Body mass index is 44.55 kg/m². Physical Examination:  General appearance - alert, well appearing, and in no distress and overweight  Mental status - alert, oriented to person, place, and time  Neck - Neck is supple, no JVD or carotid bruits. No thyromegaly or adenopathy. Chest - clear to auscultation, no wheezes, rales or rhonchi, symmetric air entry  Heart - normal rate, irregular rhythm, normal S1, S2, no murmurs, rubs, clicks or gallops  Abdomen - soft, nontender, nondistended, no masses or organomegaly  Neurological - alert, oriented, normal speech, no focal findings or movement disorder noted  Extremities - peripheral pulses normal, no pedal edema, no clubbing or cyanosis  Skin - normal coloration and turgor, no rashes, no suspicious skin lesions noted    Orders Placed This Encounter   Procedures    EKG 12 lead       ASSESSMENT:     Diagnosis Orders   1. SSS (sick sinus syndrome) (HCC)  EKG 12 lead   2. Paroxysmal atrial fibrillation (HCC)  EKG 12 lead   3. Morbid obesity with BMI of 45.0-49.9, adult ([address]     4. Chronic diastolic congestive heart failure ([address]     5. S/P cardiac pacemaker procedure     6. Morbid obesity due to excess calories ([address]     7. MORGAN (obstructive sleep apnea)     8. Abnormal EKG           PLAN:     Patient will need to continue to follow up with you for their general medical care   As always, aggressive risk factor modification is strongly recommended. We should adhere to the [address] guidelines for HTN management and the NCEP ATP III guidelines for LDL-C management. Cardiac diet is always recommended with low fat, cholesterol, calories and sodium. Continue medications at current doses.     Patient was advised and encouraged to check blood pressure at home or at a

## 2021-03-03 DIAGNOSIS — I48.0 PAROXYSMAL ATRIAL FIBRILLATION (HCC): ICD-10-CM

## 2021-03-03 RX ORDER — SOTALOL HYDROCHLORIDE 120 MG/1
TABLET ORAL
Qty: 180 TABLET | Refills: 2 | Status: SHIPPED | OUTPATIENT
Start: 2021-03-03 | End: 2021-12-03 | Stop reason: ALTCHOICE

## 2021-03-03 NOTE — TELEPHONE ENCOUNTER
requesting medication refill.  Please approve or deny this request.    Rx requested:  Requested Prescriptions     Pending Prescriptions Disp Refills    sotalol (BETAPACE) 120 MG tablet [Pharmacy Med Name: SOTALOL HCL 120MG TABLET] 180 tablet 2     Sig: TAKE ONE TABLET BY MOUTH TWICE DAILY         Last Office Visit:   2/16/2021      Next Visit Date:  Future Appointments   Date Time Provider Scott Ahn   8/19/2021 12:00 PM Syed Marrero, DO [facility]

## 2021-03-26 ENCOUNTER — TELEPHONE (OUTPATIENT)
Dept: CARDIOLOGY CLINIC | Age: 76
End: 2021-03-26

## 2021-05-11 ENCOUNTER — HOSPITAL ENCOUNTER (OUTPATIENT)
Dept: CT IMAGING | Age: 76
Discharge: HOME | End: 2021-05-13
Payer: MEDICARE

## 2021-05-11 VITALS — HEART RATE: 80 BPM | DIASTOLIC BLOOD PRESSURE: 74 MMHG | SYSTOLIC BLOOD PRESSURE: 133 MMHG

## 2021-05-11 DIAGNOSIS — C44.329 SQUAMOUS CELL CARCINOMA OF CHIN: ICD-10-CM

## 2021-05-11 PROCEDURE — 6360000004 HC RX CONTRAST MEDICATION: Performed by: INTERNAL MEDICINE

## 2021-05-11 PROCEDURE — 70491 CT SOFT TISSUE NECK W/DYE: CPT

## 2021-05-11 RX ADMIN — IOPAMIDOL 100 ML: 612 INJECTION, SOLUTION INTRAVENOUS at 10:08

## 2021-05-17 ENCOUNTER — HOSPITAL ENCOUNTER (OUTPATIENT)
Dept: RADIATION ONCOLOGY | Age: 76
Discharge: HOME | End: 2021-05-17
Attending: RADIOLOGY
Payer: MEDICARE

## 2021-05-17 ENCOUNTER — HOSPITAL ENCOUNTER (OUTPATIENT)
Dept: RADIATION ONCOLOGY | Age: 76
Discharge: HOME | End: 2021-05-17
Payer: MEDICARE

## 2021-05-17 VITALS
TEMPERATURE: 97.7 F | HEIGHT: 75 IN | HEART RATE: 81 BPM | WEIGHT: 315 LBS | RESPIRATION RATE: 18 BRPM | OXYGEN SATURATION: 98 % | DIASTOLIC BLOOD PRESSURE: 66 MMHG | BODY MASS INDEX: 39.17 KG/M2 | SYSTOLIC BLOOD PRESSURE: 126 MMHG

## 2021-05-17 PROCEDURE — 77334 RADIATION TREATMENT AID(S): CPT | Performed by: RADIOLOGY

## 2021-05-17 PROCEDURE — 77290 THER RAD SIMULAJ FIELD CPLX: CPT | Performed by: RADIOLOGY

## 2021-05-17 PROCEDURE — 99214 OFFICE O/P EST MOD 30 MIN: CPT | Performed by: RADIOLOGY
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
## 2021-05-17 NOTE — PROGRESS NOTES
SW introduced to Pt via Dr. Suzanna Addison near end of consult visit. Pt will be scheduled for 15 RT tx for cancer of the skin (face). Pt resides alone ( 1 year ago). He has two sisters and a son who live locally and are very supportive. Pt is a retired teacher (accounting). There are currently no SW needs, issues or concerns. Further support as needed or requested.

## 2021-05-17 NOTE — CONSULTS
NURSING ASSESSMENT     Date: 5/17/2021        Patient Name: Taya Hayward     YOB: 1945      Age:  70 y.o. MRN: [id number]     Chaperone [] Yes   [x] No      Advance Directives:   Do you currently have completed advance directives (living will)? [x] Yes   [] No         *If yes, please bring us a copy for your records. *If no, would you like info or assistance in completing advance directives (living will)? [] Yes   [x] No    Pain Score:   Pain Score (1-10): 0  Pain Location:    Pain Duration:  Pain Management/Control: Occasional Tylenol or TENS for occasional back pain      Is pain affecting your ability to take care of yourself or move throughout your home? [] Yes   [x] No    General: Fatigue  Patient has gained weight [] Yes   [x] No  Patient has lost weight [] Yes   [x] No  How much weight in pounds and over what length of time:     Eyes (Ophthalmic): No Problem,cheaters for reading     Skin (Dermatological): Recent Left cheek skin cancer excision 4/13/21     ENT: No Problems     Respiratory: No Problems     Cardiovascular: Cardiac Device      Device   [x] Yes   [] No   Copy of Card Obtained [x] Yes   [] No    Gastrointestinal: No Problems    Genito-Urinary:    No Problems       Breast: No Problems     Musculoskeletal: Immobility and Back Pain, joint pain    Neurological: Numbness to shins bilaterally      Hematological and Lymphatic: No Problems     Endocrine: No Problems       A 10-point review of systems has been conducted and pertinent positives have been   recorded. All other review of systems are negative    Was the patient admitted during the course of treatment OR within 30 days of treatment?  No    If yes:  Date of Admission:  Hospital:    Additional Comments: Skin cancer excision on 4/13/21 at [facility]

## 2021-05-17 NOTE — PROGRESS NOTES
Teaching & Instructions - Skin  General  Simulation  Initial Treatment  Self-Care Info  Nutrition  Social Service    Site-Specific  Side Effects  Fatigue Mgmt  Pain Mgmt  Skin/Scalp Care  Xerostomia Mgmt      Educational Handouts  Radiation Therapy & You  Site Specific Instructions  Radiation Oncology Dept Information  CBMS Program      Patient eager to learn, verbalized understanding of verbal education and handouts.

## 2021-05-18 PROCEDURE — 77331 SPECIAL RADIATION DOSIMETRY: CPT | Performed by: RADIOLOGY

## 2021-05-20 ENCOUNTER — HOSPITAL ENCOUNTER (OUTPATIENT)
Dept: CARDIOLOGY | Age: 76
Discharge: HOME | End: 2021-05-20
Payer: MEDICARE

## 2021-05-20 PROCEDURE — 93280 PM DEVICE PROGR EVAL DUAL: CPT

## 2021-05-20 PROCEDURE — 77334 RADIATION TREATMENT AID(S): CPT | Performed by: RADIOLOGY

## 2021-05-20 PROCEDURE — 77300 RADIATION THERAPY DOSE PLAN: CPT | Performed by: RADIOLOGY

## 2021-05-20 PROCEDURE — 77321 SPECIAL TELETX PORT PLAN: CPT | Performed by: RADIOLOGY

## 2021-05-24 ENCOUNTER — HOSPITAL ENCOUNTER (OUTPATIENT)
Dept: RADIATION ONCOLOGY | Age: 76
Discharge: HOME | End: 2021-05-24
Attending: RADIOLOGY
Payer: MEDICARE

## 2021-05-24 PROCEDURE — 77412 RADIATION TX DELIVERY LVL 3: CPT | Performed by: RADIOLOGY

## 2021-05-24 PROCEDURE — 77280 THER RAD SIMULAJ FIELD SMPL: CPT | Performed by: RADIOLOGY

## 2021-05-24 PROCEDURE — 77370 RADIATION PHYSICS CONSULT: CPT | Performed by: RADIOLOGY

## 2021-05-25 ENCOUNTER — HOSPITAL ENCOUNTER (OUTPATIENT)
Dept: RADIATION ONCOLOGY | Age: 76
Discharge: HOME | End: 2021-05-25
Payer: MEDICARE

## 2021-05-25 ENCOUNTER — HOSPITAL ENCOUNTER (OUTPATIENT)
Dept: RADIATION ONCOLOGY | Age: 76
Discharge: HOME | End: 2021-05-25
Attending: RADIOLOGY
Payer: MEDICARE

## 2021-05-25 PROCEDURE — 77412 RADIATION TX DELIVERY LVL 3: CPT | Performed by: RADIOLOGY

## 2021-05-25 NOTE — PROGRESS NOTES
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     STAGING:  T1 N0 M0 Stage I    SUBJECTIVE:   Sarah Forrest has now received 666 cGy in 333-cGy daily fractions directed to the left pre-auricular region for management of the above diagnosis. The patient feels well, and has no specific complaints. Past medical, surgical, social and family histories reviewed and updated as indicated. PAIN: 0    ALLERGIES:  Seasonal         Current Outpatient Medications   Medication Sig Dispense Refill    sotalol (BETAPACE) 120 MG tablet TAKE ONE TABLET BY MOUTH TWICE DAILY  180 tablet 2    apixaban (ELIQUIS) 5 MG TABS tablet Take 1 tablet by mouth 2 times daily 42 tablet 0    cetirizine (ZYRTEC ALLERGY) 10 MG tablet Take 20 mg by mouth      fluticasone (FLONASE) 50 MCG/ACT nasal spray 1 spray by NOT APPLICABLE route      lisinopril (PRINIVIL;ZESTRIL) 5 MG tablet TAKE 1 TABLET BY MOUTH DAILY 90 tablet 3    Nutritional Supplements (JUICE PLUS FIBRE PO) Take by mouth      furosemide (LASIX) 40 MG tablet TAKE 1 TABLET BY MOUTH DAILY (Patient taking differently: Take 20 mg by mouth daily ) 90 tablet 3    diphenhydrAMINE (ALLERGY) 25 MG capsule Take 25 mg by mouth 2 times daily       No current facility-administered medications for this encounter. OBJECTIVE:  Alert and fully ambulatory. Pleasant and conversant. Physical Examination: Temperature 97.2 F, blood pressure 105/53, pulse 88, respiration 18, 99% O2 saturation per RN. Constitutional: 70 y.o. male who is alert, cooperative and in no apparent distress. Fractions of radiation treatment field shows no erythema or moist desquamation. ASSESSMENT/PLAN:   Patient is doing well, tolerating radiation treatment.   RT is to continue as

## 2021-05-25 NOTE — PROGRESS NOTES
[facility] ON TREATMENT VISIT          WEEK: 1  DOSE: 333 cGy 666 cGy       CONCURRENT THERAPY: None          CARDIAC DEVICE: Yes                     ASESMENT OF SIDE EFFECTS                        SCORE EVALUATION       RADIATION DERMATITIS  1         BLEEDING  0         PRURITIS  0         FATIGUE  1         INFECTION  0         PAIN  0         NUTRITION Regular diet, good appetite          WEIGHT : 354 lb INITIAL WIEGHT  359 lb LAST WEEKS WIEGHT        VITAL SIGNS           TEMP:97.2 B/P:105/53 RESP:18 PULSE: 88 POX 99%       SMOKING : No  IF YES HOW MUCH? PATIENT ABLE TO STATE SELF-CARE MEASURES? Yes          MEDICATION LIST REVIEWED?  No           RN COMMENTS:

## 2021-05-26 ENCOUNTER — HOSPITAL ENCOUNTER (OUTPATIENT)
Dept: RADIATION ONCOLOGY | Age: 76
Discharge: HOME | End: 2021-05-26
Attending: RADIOLOGY
Payer: MEDICARE

## 2021-05-26 PROCEDURE — 77412 RADIATION TX DELIVERY LVL 3: CPT | Performed by: RADIOLOGY

## 2021-05-27 ENCOUNTER — HOSPITAL ENCOUNTER (OUTPATIENT)
Dept: RADIATION ONCOLOGY | Age: 76
Discharge: HOME | End: 2021-05-27
Attending: RADIOLOGY
Payer: MEDICARE

## 2021-05-27 PROCEDURE — 77412 RADIATION TX DELIVERY LVL 3: CPT | Performed by: RADIOLOGY

## 2021-05-28 ENCOUNTER — HOSPITAL ENCOUNTER (OUTPATIENT)
Dept: RADIATION ONCOLOGY | Age: 76
Discharge: HOME | End: 2021-05-28
Attending: RADIOLOGY
Payer: MEDICARE

## 2021-05-28 PROCEDURE — 77412 RADIATION TX DELIVERY LVL 3: CPT | Performed by: RADIOLOGY

## 2021-05-28 PROCEDURE — 77336 RADIATION PHYSICS CONSULT: CPT | Performed by: RADIOLOGY

## 2021-06-01 ENCOUNTER — HOSPITAL ENCOUNTER (OUTPATIENT)
Dept: CARDIOLOGY | Age: 76
Discharge: HOME | End: 2021-06-01
Payer: MEDICARE

## 2021-06-01 ENCOUNTER — HOSPITAL ENCOUNTER (OUTPATIENT)
Dept: RADIATION ONCOLOGY | Age: 76
Discharge: HOME | End: 2021-06-01
Attending: RADIOLOGY
Payer: MEDICARE

## 2021-06-01 PROCEDURE — 93296 REM INTERROG EVL PM/IDS: CPT

## 2021-06-01 PROCEDURE — 77412 RADIATION TX DELIVERY LVL 3: CPT | Performed by: RADIOLOGY

## 2021-06-02 ENCOUNTER — HOSPITAL ENCOUNTER (OUTPATIENT)
Dept: RADIATION ONCOLOGY | Age: 76
Discharge: HOME | End: 2021-06-02
Attending: RADIOLOGY
Payer: MEDICARE

## 2021-06-02 PROCEDURE — 77336 RADIATION PHYSICS CONSULT: CPT | Performed by: RADIOLOGY

## 2021-06-02 PROCEDURE — 77412 RADIATION TX DELIVERY LVL 3: CPT | Performed by: RADIOLOGY

## 2021-06-02 NOTE — PROGRESS NOTES
Roddy [facility]   [id number]  1945   Patient Mid-Point Distress Screening Form   Please select the number that describes how much distress you have experiened in the past week (0-10): 1    Please select the number that descrbes how much distress you have experienced today (0-10): 1    If you responded with a score of 6 or above to the first tow questions; please address the following concerns:    Practical Concerns 0-10 Comment        Work, Concerns about Job          Finances, Bills, Insurance          Housing          Transportation          Availability of Caregiver     Family Concerns          Dealing with Children          Dealing with Partner          Ability to have [address] Issues     Emotional Concerns          Sadness, Depression          Anxiety, Worry, Fear          Concerns about Appearance          Loss of Interest in Usual Activities     Spiritual Concerns          Connection to a higher power          Sense of meaning and purpose          Support for my spiritual community     Physical Concerns          Nausea          Eating, Loss of Appetite          Pain          Fatigue       Other Concerns/Notes:Pt states has a minimal distress score of 1 due to soreness in jaw area and redness of skin. States side effects were expected. States no further issues, concerns or needs.   Date of visit: 6/2/2021  3:05 PM      : Tayo Pereyra [facility]   [id number]  1945   Patient Mid-Point Distress Screening Form   Please select the number that describes how much distress you have experiened in the past week (0-10): 1    Please select the number that descrbes how much distress you have experienced today (0-10): 1    If you responded with a score of 6 or above to the first tow questions; please address the following concerns:    Practical Concerns 0-10 Comment        Work, Concerns about Monica Saab Transportation          Availability of Caregiver     Family Concerns          Dealing with Children          Dealing with Partner          Ability to have [address] Issues     Emotional Concerns          Sadness, Depression          Anxiety, Worry, Fear          Concerns about Appearance          Loss of Interest in Usual Activities     Spiritual Concerns          Connection to a higher power          Sense of meaning and purpose          Support for my spiritual community     Physical Concerns          Nausea          Eating, Loss of Appetite          Pain          Fatigue       Other Concerns/Notes:    Date of visit: 6/2/2021  3:05 PM      : KRYSTEN Emmanuel

## 2021-06-02 NOTE — PROGRESS NOTES
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     STAGING:  T1 N0 M0 Stage I     SUBJECTIVE:   Elaine Darby has now received 2331 cGy in 333-cGy daily fractions directed to the left pre-auricular region for management of the above diagnosis. Patient denies any oral sores or xerostomia. Past medical, surgical, social and family histories reviewed and updated as indicated. PAIN: 0    ALLERGIES:  Seasonal         Current Outpatient Medications   Medication Sig Dispense Refill    sotalol (BETAPACE) 120 MG tablet TAKE ONE TABLET BY MOUTH TWICE DAILY  180 tablet 2    apixaban (ELIQUIS) 5 MG TABS tablet Take 1 tablet by mouth 2 times daily 42 tablet 0    cetirizine (ZYRTEC ALLERGY) 10 MG tablet Take 20 mg by mouth      fluticasone (FLONASE) 50 MCG/ACT nasal spray 1 spray by NOT APPLICABLE route      lisinopril (PRINIVIL;ZESTRIL) 5 MG tablet TAKE 1 TABLET BY MOUTH DAILY 90 tablet 3    Nutritional Supplements (JUICE PLUS FIBRE PO) Take by mouth      furosemide (LASIX) 40 MG tablet TAKE 1 TABLET BY MOUTH DAILY (Patient taking differently: Take 20 mg by mouth daily ) 90 tablet 3    diphenhydrAMINE (ALLERGY) 25 MG capsule Take 25 mg by mouth 2 times daily       No current facility-administered medications for this encounter. OBJECTIVE:  Alert and fully ambulatory. Pleasant and conversant. Physical Examination:  Constitutional: 70 y.o. male who is alert, cooperative and in no apparent distress. Inspection of the radiation field shows a faint erythema, no moist desquamation, no gross alopecia. ASSESSMENT/PLAN:   Patient is doing well, tolerating radiation treatment. RT is to continue as planned.     Tosha Thomas MD PhD  Radiation Oncologist, [facility]    Department of Radiation [address], [facility]

## 2021-06-03 ENCOUNTER — HOSPITAL ENCOUNTER (OUTPATIENT)
Dept: RADIATION ONCOLOGY | Age: 76
Discharge: HOME | End: 2021-06-03
Attending: RADIOLOGY
Payer: MEDICARE

## 2021-06-03 PROCEDURE — 77412 RADIATION TX DELIVERY LVL 3: CPT | Performed by: RADIOLOGY

## 2021-06-04 ENCOUNTER — HOSPITAL ENCOUNTER (OUTPATIENT)
Dept: RADIATION ONCOLOGY | Age: 76
Discharge: HOME | End: 2021-06-04
Attending: RADIOLOGY
Payer: MEDICARE

## 2021-06-04 PROCEDURE — 77412 RADIATION TX DELIVERY LVL 3: CPT | Performed by: RADIOLOGY

## 2021-06-08 ENCOUNTER — HOSPITAL ENCOUNTER (OUTPATIENT)
Dept: RADIATION ONCOLOGY | Age: 76
Discharge: HOME | End: 2021-06-08
Attending: RADIOLOGY
Payer: MEDICARE

## 2021-06-08 ENCOUNTER — HOSPITAL ENCOUNTER (OUTPATIENT)
Dept: CARDIOLOGY | Age: 76
Discharge: HOME | End: 2021-06-08
Payer: MEDICARE

## 2021-06-08 PROCEDURE — 77412 RADIATION TX DELIVERY LVL 3: CPT | Performed by: RADIOLOGY

## 2021-06-08 PROCEDURE — 93296 REM INTERROG EVL PM/IDS: CPT

## 2021-06-08 RX ORDER — LEVOCETIRIZINE DIHYDROCHLORIDE 5 MG/1
5 TABLET, FILM COATED ORAL DAILY
COMMUNITY

## 2021-06-08 NOTE — PROGRESS NOTES
[facility]IN [facility]ARE[facility] ON TREATMENT VISIT          WEEK:  2  DOSE: 333cGy 3663cGy        CONCURRENT THERAPY: None          CARDIAC DEVICE: Pacermaker                     ASESMENT OF SIDE EFFECTS                        SCORE EVALUATION       RADIATION DERMATITIS Brisk erythema and dry to left side of cheek 2         BLEEDING  0         PRURITIS  0         FATIGUE mild 1         INFECTION  0         PAIN  0         NUTRITION Appetite is good. Regular diet          WEIGHT   355.0 lb INITIAL WIEGHT LAST WEEKS WIEGHT  5/25/21: 354 lb        VITAL SIGNS           TEMP: B/P:  104/56 RESP:  16 PULSE:  87 POX  99       SMOKING :  No  IF YES HOW MUCH? PATIENT ABLE TO STATE SELF-CARE MEASURES? Yes          MEDICATION LIST REVIEWED?   Yes           RN COMMENTS:

## 2021-06-08 NOTE — PROGRESS NOTES
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     STAGING:  T1 N0 M0 Stage I     SUBJECTIVE:   Sarbjit Stewart has now received 3330 cGy in 333-cGy daily fractions directed to the left pre-auricular region for management of the above diagnosis. Other than having noted erythema in the radius treatment field, the patient has no skin complaints and denies any oral sores or odynophagia. Past medical, surgical, social and family histories reviewed and updated as indicated. PAIN: 0    ALLERGIES:  Seasonal         Current Outpatient Medications   Medication Sig Dispense Refill    levocetirizine (XYZAL) 5 MG tablet Take 5 mg by mouth daily      sotalol (BETAPACE) 120 MG tablet TAKE ONE TABLET BY MOUTH TWICE DAILY  180 tablet 2    apixaban (ELIQUIS) 5 MG TABS tablet Take 1 tablet by mouth 2 times daily 42 tablet 0    fluticasone (FLONASE) 50 MCG/ACT nasal spray 1 spray by NOT APPLICABLE route      lisinopril (PRINIVIL;ZESTRIL) 5 MG tablet TAKE 1 TABLET BY MOUTH DAILY 90 tablet 3    Nutritional Supplements (JUICE PLUS FIBRE PO) Take by mouth      furosemide (LASIX) 40 MG tablet TAKE 1 TABLET BY MOUTH DAILY (Patient taking differently: Take 20 mg by mouth daily ) 90 tablet 3     No current facility-administered medications for this encounter. OBJECTIVE:  Alert and fully ambulatory. Pleasant and conversant. Physical Examination: Blood pressure 104/56, pulse 87, respirations 16, 99% O2 saturation per RN. Constitutional: 70 y.o. male who is alert, cooperative and in no apparent distress. Inspection of the radiation treatment field shows erythema outlining the electron field borders, no moist desquamation, no evidence of infection.     ASSESSMENT/PLAN:   Patient is doing well,

## 2021-06-09 ENCOUNTER — HOSPITAL ENCOUNTER (OUTPATIENT)
Dept: RADIATION ONCOLOGY | Age: 76
Discharge: HOME | End: 2021-06-09
Attending: RADIOLOGY
Payer: MEDICARE

## 2021-06-09 PROCEDURE — 77412 RADIATION TX DELIVERY LVL 3: CPT | Performed by: RADIOLOGY

## 2021-06-10 ENCOUNTER — HOSPITAL ENCOUNTER (OUTPATIENT)
Dept: RADIATION ONCOLOGY | Age: 76
Discharge: HOME | End: 2021-06-10
Attending: RADIOLOGY
Payer: MEDICARE

## 2021-06-10 PROCEDURE — 77412 RADIATION TX DELIVERY LVL 3: CPT | Performed by: RADIOLOGY

## 2021-06-11 ENCOUNTER — HOSPITAL ENCOUNTER (OUTPATIENT)
Dept: RADIATION ONCOLOGY | Age: 76
Discharge: HOME | End: 2021-06-11
Attending: RADIOLOGY
Payer: MEDICARE

## 2021-06-11 PROCEDURE — 77412 RADIATION TX DELIVERY LVL 3: CPT | Performed by: RADIOLOGY

## 2021-06-14 ENCOUNTER — HOSPITAL ENCOUNTER (OUTPATIENT)
Dept: RADIATION ONCOLOGY | Age: 76
Discharge: HOME | End: 2021-06-14
Attending: RADIOLOGY
Payer: MEDICARE

## 2021-06-14 PROCEDURE — 77412 RADIATION TX DELIVERY LVL 3: CPT | Performed by: RADIOLOGY

## 2021-06-15 ENCOUNTER — HOSPITAL ENCOUNTER (OUTPATIENT)
Dept: RADIATION ONCOLOGY | Age: 76
Discharge: HOME | End: 2021-06-15
Attending: RADIOLOGY
Payer: MEDICARE

## 2021-06-15 VITALS
TEMPERATURE: 97.1 F | OXYGEN SATURATION: 98 % | RESPIRATION RATE: 18 BRPM | WEIGHT: 315 LBS | BODY MASS INDEX: 44.02 KG/M2 | SYSTOLIC BLOOD PRESSURE: 116 MMHG | HEART RATE: 76 BPM | DIASTOLIC BLOOD PRESSURE: 65 MMHG

## 2021-06-15 PROCEDURE — 77412 RADIATION TX DELIVERY LVL 3: CPT | Performed by: RADIOLOGY

## 2021-06-15 NOTE — PROGRESS NOTES
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     STAGING:  T1 N0 M0 Stage I     SUBJECTIVE:   Sarbjit Stewart has completed 4995 cGy today in 333-cGy daily fractions directed to the left pre-auricular region for management of the above diagnosis. He has noted hyperpigmentation of benign skin lesions in the radiation treatment field, reports that he made a mistake of applying some wet cloth to the left side of his face only to discover that it hurts. He has no oral sores. Past medical, surgical, social and family histories reviewed and updated as indicated. PAIN: None    ALLERGIES:  Seasonal         Current Outpatient Medications   Medication Sig Dispense Refill    levocetirizine (XYZAL) 5 MG tablet Take 5 mg by mouth daily      sotalol (BETAPACE) 120 MG tablet TAKE ONE TABLET BY MOUTH TWICE DAILY  180 tablet 2    apixaban (ELIQUIS) 5 MG TABS tablet Take 1 tablet by mouth 2 times daily 42 tablet 0    fluticasone (FLONASE) 50 MCG/ACT nasal spray 1 spray by NOT APPLICABLE route      lisinopril (PRINIVIL;ZESTRIL) 5 MG tablet TAKE 1 TABLET BY MOUTH DAILY 90 tablet 3    Nutritional Supplements (JUICE PLUS FIBRE PO) Take by mouth      furosemide (LASIX) 40 MG tablet TAKE 1 TABLET BY MOUTH DAILY (Patient taking differently: Take 20 mg by mouth daily ) 90 tablet 3     No current facility-administered medications for this encounter. OBJECTIVE:  Alert and fully ambulatory. Pleasant and conversant. Physical Examination: Temperature 97.1 F, blood pressure 116/65, pulse 76, respiration 18, weight 352.2 pounds, 98% O2 saturation per RN. Constitutional: 70 y.o. male who is alert, cooperative and in no apparent distress.   Inspection of the radiation treatment field shows erythema outlining the radiation field borders without moist desquamation or evidence of infection. Benign skin lesions in the radiation treatment fields have become hyperpigmented and appear to be about to be desquamated. ASSESSMENT/PLAN:   Patient is doing well, completing radiation treatment today. I have asked him to return for follow-up in approximately 1 month.     Tisha Christensen MD PhD  Radiation Oncologist, [address] [place] [address]

## 2021-06-15 NOTE — PROGRESS NOTES
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     STAGING:  T1 N0 M0 Stage I      Site Start [place] Last [place] ED Fractions Dose Fx Dose Technique   Left  preauricular surgical bed 05- 06-  15 49.95 Gy 3.33 Gy En face electrons. Response/Tolerance: Patient did well through the radiation treatment course, and completed radiation with erythema from radiation epidermidis in the radiation treatment field, as well as hyperpigmentation of benign skin lesions there. I expect that these skin lesions will desquamate. Follow-up: I have asked him to return for follow-up in approximately 1 month.     Silke Rodriguez MD PhD  Radiation Oncologist, [address] [place] [address]

## 2021-06-15 NOTE — PROGRESS NOTES
SKIN CAREPATH ON TREATMENT VISIT          WEEK:  3  DOSE: 4,995 cGy         CONCURRENT THERAPY: None          CARDIAC DEVICE: Yes                     ASESMENT OF SIDE EFFECTS                        SCORE EVALUATION       RADIATION DERMATITIS Brisk pink with dry scaly skin patches 2         BLEEDING  0         PRURITIS Mild 1         FATIGUE Mild 1         INFECTION  0         PAIN  0         NUTRITION Regular diet, eats well. WEIGHT 352.2lb INITIAL WIEGHT LAST WEEKS WIEGHT 355.0lb        VITAL SIGNS           TEMP:  97.1 B/P:  116/65 RESP:  18 PULSE:  76 POX  98       SMOKING : No  IF YES HOW MUCH? PATIENT ABLE TO STATE SELF-CARE MEASURES? Yes          MEDICATION LIST REVIEWED? Yes           RN COMMENTS: Patient's last RT complete.  F/U appt 7/14/21 at 9:00 am

## 2021-06-16 ENCOUNTER — HOSPITAL ENCOUNTER (OUTPATIENT)
Dept: CARDIOLOGY | Age: 76
Discharge: HOME | End: 2021-06-16
Payer: MEDICARE

## 2021-06-16 PROCEDURE — 93296 REM INTERROG EVL PM/IDS: CPT

## 2021-07-14 ENCOUNTER — HOSPITAL ENCOUNTER (OUTPATIENT)
Dept: RADIATION ONCOLOGY | Age: 76
Discharge: HOME | End: 2021-07-14
Attending: RADIOLOGY
Payer: MEDICARE

## 2021-07-14 PROCEDURE — 99213 OFFICE O/P EST LOW 20 MIN: CPT | Performed by: RADIOLOGY

## 2021-07-14 NOTE — PROGRESS NOTES
NURSING ASSESSMENT     Date: 7/14/2021        Patient Name: Juancarlos Major     YOB: 1945      Age:  70 y.o. MRN: [id number]     Chaperone [] Yes   [x] No      Advance Directives:   Do you currently have completed advance directives (living will)? [x] Yes   [] No         *If yes, please bring us a copy for your records. *If no, would you like info or assistance in completing advance directives (living will)? [] Yes   [] No    Pain Score:   Pain Score (1-10): 1  Pain Location[de-identified] lower back  Pain Duration: flares up with activity  Pain Management/Control: Tylenol on occasion      Is pain affecting your ability to take care of yourself or move throughout your home? [] Yes   [x] No    General: No Problems  Patient has gained weight [] Yes   [x] No  Patient has lost weight [] Yes   [x] No  How much weight in pounds and over what length of time:     Eyes (Ophthalmic): No Problem     Skin (Dermatological): left periauricular area has faint pink color, skin is smooth     ENT: Itchiness to left ear, occasional faint echo in left ear when in a large room     Respiratory: No Problems     Cardiovascular: Cardiac Device, pacemaker. H/O afib      Device   [x] Yes   [] No   Copy of Card Obtained [x] Yes   [] No    Gastrointestinal: No Problems    Genito-Urinary:    No Problems, other than occasional urgency       Breast: No Problems     Musculoskeletal: Back Pain, uses cane with ambulation    Neurological: No Problems      Hematological and Lymphatic: No Problems     Endocrine: No Problems         A 10-point review of systems has been conducted and pertinent positives have been   recorded. All other review of systems are negative    Was the patient admitted during the course of treatment OR within 30 days of treatment?  No

## 2021-07-14 NOTE — PROGRESS NOTES
SW attended Pt Survivorship visit with SHAZIA Goldstein. Pt received Survivorship packet from RN. Pt has no SW needs, concerns or issues at this time. Pt has continued positive social and family support system.

## 2021-07-14 NOTE — PROGRESS NOTES
DEPARTMENT OF RADIATION ONCOLOGY   Follow up visit        2021    NAME:  Austin Cason    :  1945 70 y.o. 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     STAGING:  T1 N0 M0 Stage I        Site Start TX Last [place] ED Fractions Dose Fx Dose Technique   Left  preauricular surgical bed 05- 06-   15 49.95 Gy 3.33 Gy En face electrons.        RECENT HISTORY: Austin Cason returns for follow up status post Ally 15, 2021 completion of 49.95 Gy to the left preauricular surgical bed for the above diagnosis. He volunteers has received a lot of comments from people regarding how smooth the skin is in the radiation treatment field. He protects it by wearing a wide brim hat in the sun and has appointment to see his dermatologist on 2021. Past medical, surgical, social and family histories reviewed and updated as indicated.     ALLERGIES:  Seasonal       MEDICATIONS:   Current Outpatient Medications:     levocetirizine (XYZAL) 5 MG tablet, Take 5 mg by mouth daily, Disp: , Rfl:     sotalol (BETAPACE) 120 MG tablet, TAKE ONE TABLET BY MOUTH TWICE DAILY , Disp: 180 tablet, Rfl: 2    apixaban (ELIQUIS) 5 MG TABS tablet, Take 1 tablet by mouth 2 times daily, Disp: 42 tablet, Rfl: 0    fluticasone (FLONASE) 50 MCG/ACT nasal spray, 1 spray by NOT APPLICABLE route, Disp: , Rfl:     Nutritional Supplements (JUICE PLUS FIBRE PO), Take by mouth, Disp: , Rfl:     furosemide (LASIX) 40 MG tablet, TAKE 1 TABLET BY MOUTH DAILY (Patient taking differently: Take 20 mg by mouth daily ), Disp: 90 tablet, Rfl: 3    PHYSICAL EXAMINATION:        Wt Readings from Last 3 Encounters:   06/15/21 (!) 352 lb 3.2 oz (159.8 kg)   21 (!) 359 lb (162.8 kg)   21 (!) 356 lb 6.4 oz (161.7 kg)       ECOG PERFORMANCE STATUS: 3    Constitutional: 70 y.o. male who is alert, cooperative and in no apparent distress. He looks well. HEENT:   Inspection of palpation of the radiation treatment field shows smooth skin with no suspicious lesions. There is no erythema, moist desquamation, or evidence of infection. Lymph: Palpation discloses no adenopathies in the bilateral preauricular, posterior auricular, neck, or supraclavicular regions. ASSESSMENT/PLAN: The patient is doing well approximately 1 month after completing radiation with no adverse sequelae. As he will be seeing his dermatologist for regular follow-up, I have asked him to return here for follow-up on an as-needed basis.     Eve Crain MD PhD  Radiation Oncologist  Diplomate, American Board of Radiology -- Radiation Oncology    Department of [address] [place] [address] -- [facility]  [address] [facility]  [address]

## 2021-08-24 ENCOUNTER — OFFICE VISIT (OUTPATIENT)
Dept: CARDIOLOGY CLINIC | Age: 76
End: 2021-08-24
Payer: MEDICARE

## 2021-08-24 VITALS
DIASTOLIC BLOOD PRESSURE: 80 MMHG | HEART RATE: 80 BPM | BODY MASS INDEX: 45.12 KG/M2 | SYSTOLIC BLOOD PRESSURE: 120 MMHG | TEMPERATURE: 97.4 F | WEIGHT: 315 LBS

## 2021-08-24 DIAGNOSIS — I49.5 SSS (SICK SINUS SYNDROME) (HCC): ICD-10-CM

## 2021-08-24 DIAGNOSIS — E66.01 MORBID OBESITY DUE TO EXCESS CALORIES (HCC): ICD-10-CM

## 2021-08-24 DIAGNOSIS — G47.33 OSA (OBSTRUCTIVE SLEEP APNEA): ICD-10-CM

## 2021-08-24 DIAGNOSIS — I48.0 PAROXYSMAL ATRIAL FIBRILLATION (HCC): Primary | ICD-10-CM

## 2021-08-24 DIAGNOSIS — I50.32 CHRONIC DIASTOLIC CONGESTIVE HEART FAILURE (HCC): ICD-10-CM

## 2021-08-24 DIAGNOSIS — Z95.0 S/P CARDIAC PACEMAKER PROCEDURE: ICD-10-CM

## 2021-08-24 DIAGNOSIS — E66.01 MORBID OBESITY WITH BMI OF 45.0-49.9, ADULT (HCC): ICD-10-CM

## 2021-08-24 PROCEDURE — 93000 ELECTROCARDIOGRAM COMPLETE: CPT | Performed by: INTERNAL MEDICINE

## 2021-08-24 PROCEDURE — 99214 OFFICE O/P EST MOD 30 MIN: CPT | Performed by: INTERNAL MEDICINE

## 2021-08-24 NOTE — PROGRESS NOTES
Chief Complaint   Patient presents with    Atrial Fibrillation    6 Month Follow-Up       12-8-16: Patient presents for initial medical evaluation. Patient is followed on a regular basis by Dr. Karla Chandler MD. Was in [place] with a lung infection a year ago. States his Echo with EF of 45%, but per recent echo in 4/2016 with EF of 50%, mod PHTN, mild MR, grade II DD, biatrial enlargement. He did have a LHC in [place] and had mild CAD per patient, no PCI was performed. Pt denies chest pain, dyspnea, dyspnea on exertion, change in exercise capacity, fatigue,  nausea, vomiting, diarrhea, constipation, motor weakness, insomnia, weight loss, syncope, dizziness, lightheadedness, palpitations, PND, orthopnea, or claudication. Was noted to have new onset afibb by pcp and placed on Eliquis. He does have MORGAN and compliant with CPAP machine. 5-2-17: s/p sotalol loading at [facility] and . EKG today shows Afibb with CVR, normal QTC. did have PNA down in [place]. S/p event monitors in [place] and b showing 2 sec pauses, HR in 40's at times, mostly in 50-60's, some episodes into 130-150's. Compliant with CPAP machine nightly. States he is fatigued and sleeps more than he usually does. Does need knee replacement surgery in 9/2017. Pt denies chest pain,   nausea, vomiting, diarrhea, constipation, motor weakness, insomnia, weight loss, syncope, dizziness, lightheadedness, palpitations, PND, orthopnea. 7-11-17: s/p dual chamber PPM placement at [facility] in 6/2017. States he feels good overall. Pacemaker site is healing well. Pt denies chest pain, dyspnea, dyspnea on exertion, change in exercise capacity, fatigue,  nausea, vomiting, diarrhea, constipation, motor weakness, insomnia, weight loss, syncope, dizziness, lightheadedness, palpitations, PND, orthopnea, or claudication. On Sotalol and Eliquis    9-19-17: s/p CV at [facility]. EKG with Apaced rhythm. QTC is 441ms. He is feeling good. On [address] and no bleeding issues.  States he has more energy now as well. Pt denies chest pain, dyspnea, dyspnea on exertion, change in exercise capacity, fatigue,  nausea, vomiting, diarrhea, constipation, motor weakness, insomnia, weight loss, syncope, dizziness, lightheadedness, palpitations, PND, orthopnea, or claudication. Does have some URI type symptoms now. Wants to get left knee operated on. Not able to lose weight. Compliant with CPAP.       3-20-18: s/p knee surgery and did well. s/p PPM check and was fine. Breathing ok. Pt denies chest pain, dyspnea, dyspnea on exertion, change in exercise capacity, fatigue,  nausea, vomiting, diarrhea, constipation, motor weakness, insomnia, weight loss, syncope, dizziness, lightheadedness, palpitations, PND, orthopnea, or claudication. Using a wiley to ambulate. No nitro use. BP and hr are good. CAD is stable. No LE discoloration or ulcers. No LE edema. No CHF type symptoms. Lipid profile is normal. EKG with first degree AVB, normal QTC. Remains on sotalol and Eliquis, no bleeding issues. 9-18-18: Pt denies chest pain, dyspnea, dyspnea on exertion, change in exercise capacity, fatigue,  nausea, vomiting, diarrhea, constipation, motor weakness, insomnia, weight loss, syncope, dizziness, lightheadedness, palpitations, PND, orthopnea, or claudication. No nitro use. BP and hr are good. CAD is stable. No LE discoloration or ulcers. No LE edema. No CHF type symptoms. Lipid profile is normal. No recent hospitalization. No change in meds. Remains on sotalol 120mg BID and NOAC. No bleeding issues. EKG with first degree AVB, normal QTC. States he is active in his yard. LDL is 98. Compliant with CPAP. Not losing weight. 3-19-19: has URI type symptoms. Pt denies chest pain, dyspnea, dyspnea on exertion, change in exercise capacity, fatigue,  nausea, vomiting, diarrhea, constipation, motor weakness, insomnia, weight loss, syncope, dizziness, lightheadedness, palpitations, PND, orthopnea, or claudication. No nitro use. BP and hr are good. CAD is stable. No LE discoloration or ulcers. No LE edema. No CHF type symptoms. Lipid profile is normal. No recent hospitalization. No change in meds. Compliant with CPAP. Remains on sotalol and Eliquis. EKG with NSR, normal QTC. S/p PPm check which was good. LDL is 98. Hx of mild CAD per [facility]. 9-24-19: doing well. Pt denies chest pain, dyspnea, dyspnea on exertion, change in exercise capacity, fatigue,  nausea, vomiting, diarrhea, constipation, motor weakness, insomnia, weight loss, syncope, dizziness, lightheadedness, palpitations, PND, orthopnea, or claudication. No nitro use. BP and hr are good. CAD is stable. No LE discoloration or ulcers. No LE edema. No CHF type symptoms. Lipid profile is normal. No recent hospitalization. No change in meds. S/p PPM check with less than 0.1% in Af, longest episode was 1min in 5/19. EKG with NSR, normal QTC. On Sotalol and DOAC. No bleeding. + CPAP qhs. On lasix taking half tablet of 40mg daily. 8-4-20: hx of Pafib. On sotalol and DOAC. Pt denies chest pain, dyspnea, dyspnea on exertion, change in exercise capacity, fatigue,  nausea, vomiting, diarrhea, constipation, motor weakness, insomnia, weight loss, syncope, dizziness, lightheadedness, palpitations, PND, orthopnea, or claudication. No nitro use. BP and hr are good. CAD is stable. No LE discoloration or ulcers. No LE edema. No CHF type symptoms. Lipid profile is normal. No recent hospitalization. No change in meds. No bleeding issues. Hx of MORGAN on CPAP. Remain on lasix 20mg daily. Hx of SSS s/p PPM. S/p check in 2/2020 with very brief VT. EKG with first degree AVB. 2-16-21: Doing ok overall. hx of Pafib. On sotalol and DOAC. Was shoveling snow toady and did ok.  Pt denies chest pain, dyspnea, change in exercise capacity, fatigue,  nausea, vomiting, diarrhea, constipation, motor weakness, insomnia, weight loss, syncope, dizziness, lightheadedness, palpitations, PND, orthopnea, or claudication. Hx of MORGAN on CPAP. Remain on lasix 20mg daily. Hx of SSS s/p PPM. BP is good today. EKG with first degree AVB, QTC is normal. S/p pacer check with 2 short NSVT episodes. On lasix 20mg daily. 8/24/2021:hx of Pafib. On sotalol and DOAC. Taken off of blood pressure medication secondary to hypotension. Doing better after doing so. Hx of SSS s/p PPM.  Status post recent pacemaker check which was okay. Pt denies chest pain, dyspnea, dyspnea on exertion, change in exercise capacity, fatigue,  nausea, vomiting, diarrhea, constipation, motor weakness, insomnia, weight loss, syncope, dizziness, lightheadedness, palpitations, PND, orthopnea, or claudication. History of MORGAN not on CPAP machine anymore. EKG with first-degree AV block. QTc interval is normal.        Patient Active Problem List   Diagnosis    LVH (left ventricular hypertrophy)    Seasonal allergic rhinitis    DDD (degenerative disc disease), lumbosacral    Morbid obesity with BMI of 45.0-49.9, adult (HCC)    Chronic diastolic congestive heart failure (HCC)    SSS (sick sinus syndrome) ([facility]    S/P cardiac pacemaker procedure    MORGAN (obstructive sleep apnea)    Morbid obesity due to excess calories (HCC)    Basal cell carcinoma (BCC) of skin of nose    Paroxysmal atrial fibrillation [facility])       Past Surgical History:   Procedure Laterality Date    CARDIAC CATHETERIZATION  5/05    CATARACT REMOVAL  12/2012    both eyes ccf in [place] done by     COLONOSCOPY  11/6/09    DR PLAZA    PACEMAKER INSERTION      SKIN CANCER EXCISION      TOTAL KNEE ARTHROPLASTY  3/07    RT    TOTAL KNEE ARTHROPLASTY Left 11/15/2017    Dr. Fawad Birch History     Socioeconomic History    Marital status:       Spouse name: Not on file    Number of children: Not on file    Years of education: Not on file    Highest education level: Not on file   Occupational History    Not on file   Tobacco Use    Smoking status: Never Smoker    Smokeless tobacco: Never Used   Vaping Use    Vaping Use: Never used   Substance and Sexual Activity    Alcohol use: Yes     Comment: OCC    Drug use: No    Sexual activity: Not on file   Other Topics Concern    Not on file   Social History Narrative    Not on file     Social Determinants of Health     Financial Resource Strain:     Difficulty of Paying Living Expenses:    Food Insecurity:     Worried About Running Out of Food in the Last Year:     [address] in the Last Year:    Transportation Needs:     Lack of Transportation (Medical):      Lack of Transportation (Non-Medical):    Physical Activity:     Days of Exercise per Week:     Minutes of Exercise per Session:    Stress:     Feeling of Stress :    Social Connections:     Frequency of Communication with Friends and Family:     Frequency of Social Gatherings with Friends and Family:     Attends [employer] Services:     Active Member of Clubs or Organizations:     Attends Club or Organization Meetings:     Marital Status:    Intimate Partner Violence:     Fear of Current or Ex-Partner:     Emotionally Abused:     Physically Abused:     Sexually Abused:        Family History   Problem Relation Age of Onset    Asthma Mother     High Blood Pressure Father     Cancer Father     Ataxia Father     Other Father         CEREBRAL HEMMORHAGE    Other Sister         CLL    Other Other         AAA       Current Outpatient Medications   Medication Sig Dispense Refill    levocetirizine (XYZAL) 5 MG tablet Take 5 mg by mouth daily      sotalol (BETAPACE) 120 MG tablet TAKE ONE TABLET BY MOUTH TWICE DAILY  180 tablet 2    apixaban (ELIQUIS) 5 MG TABS tablet Take 1 tablet by mouth 2 times daily 42 tablet 0    fluticasone (FLONASE) 50 MCG/ACT nasal spray 1 spray by NOT APPLICABLE route      Nutritional Supplements (JUICE PLUS FIBRE PO) Take by mouth      furosemide (LASIX) 40 MG tablet TAKE 1 TABLET BY MOUTH DAILY (Patient taking differently: Take 20 mg by mouth daily ) 90 tablet 3     No current facility-administered medications for this visit. Seasonal    Review of Systems:  General ROS: negative  Psychological ROS: negative  Hematological and Lymphatic ROS: No history of blood clots or bleeding disorder. Respiratory ROS: no cough, shortness of breath, or wheezing  Cardiovascular ROS: no chest pain or dyspnea on exertion  Gastrointestinal ROS: no abdominal pain, change in bowel habits, or black or bloody stools  Genito-Urinary ROS: no dysuria, trouble voiding, or hematuria  Musculoskeletal ROS: negative  Neurological ROS: negative  Dermatological ROS: negative    VITALS:  Blood pressure 120/80, pulse 80, temperature 97.4 °F (36.3 °C), temperature source Infrared, weight (!) 361 lb (163.7 kg). Body mass index is 45.12 kg/m². Physical Examination:  General appearance - alert, well appearing, and in no distress and overweight  Mental status - alert, oriented to person, place, and time  Neck - Neck is supple, no JVD or carotid bruits. No thyromegaly or adenopathy. Chest - clear to auscultation, no wheezes, rales or rhonchi, symmetric air entry  Heart - normal rate, irregular rhythm, normal S1, S2, no murmurs, rubs, clicks or gallops  Abdomen - soft, nontender, nondistended, no masses or organomegaly  Neurological - alert, oriented, normal speech, no focal findings or movement disorder noted  Extremities - peripheral pulses normal, no pedal edema, no clubbing or cyanosis  Skin - normal coloration and turgor, no rashes, no suspicious skin lesions noted    Orders Placed This Encounter   Procedures    EKG 12 Lead       ASSESSMENT:     Diagnosis Orders   1. Paroxysmal atrial fibrillation (HCC)  EKG 12 Lead   2. Chronic diastolic congestive heart failure ([address]     3. Morbid obesity due to excess calories ([address]     4. Morbid obesity with BMI of 45.0-49.9, adult ([address]     5. MORGAN (obstructive sleep apnea)     6.  S/P cardiac pacemaker procedure     7. SSS (sick sinus syndrome) ([facility])           PLAN:     Patient will need to continue to follow up with you for their general medical care   As always, aggressive risk factor modification is strongly recommended. We should adhere to the [address] guidelines for HTN management and the NCEP ATP III guidelines for LDL-C management. Cardiac diet is always recommended with low fat, cholesterol, calories and sodium. Continue medications at current doses. Patient was advised and encouraged to check blood pressure at home or at a pharmacy, maintain a logbook, and also call us back if blood pressure are above the target ranges or if it is low. Patient clearly understands and agrees to the instructions. We will need to continue to monitor muscle and liver enzymes, BUN, CR, and electrolytes. Weight loss discussed. CPAP no longer needed. Continue with sotalol 120mg BID, DOAC    Check EKG    Follow up with Pacer clinic. Patient is to avoid any excessive caffeine, chocolate, or OTC stimulants. Thank you for allowing me to participate in the care of your patient, please don't hesitate to contact me if you have any further questions.

## 2021-09-14 ENCOUNTER — HOSPITAL ENCOUNTER (OUTPATIENT)
Dept: CARDIOLOGY | Age: 76
Discharge: HOME | End: 2021-09-14
Payer: MEDICARE

## 2021-09-14 PROCEDURE — 93296 REM INTERROG EVL PM/IDS: CPT

## 2021-12-03 ENCOUNTER — OFFICE VISIT (OUTPATIENT)
Dept: CARDIOLOGY CLINIC | Age: 76
End: 2021-12-03
Payer: MEDICARE

## 2021-12-03 VITALS
HEIGHT: 75 IN | DIASTOLIC BLOOD PRESSURE: 85 MMHG | WEIGHT: 315 LBS | HEART RATE: 71 BPM | BODY MASS INDEX: 39.17 KG/M2 | OXYGEN SATURATION: 96 % | SYSTOLIC BLOOD PRESSURE: 135 MMHG

## 2021-12-03 DIAGNOSIS — I48.91 ATRIAL FIBRILLATION, UNSPECIFIED TYPE (HCC): Primary | ICD-10-CM

## 2021-12-03 PROCEDURE — 99213 OFFICE O/P EST LOW 20 MIN: CPT | Performed by: NURSE PRACTITIONER

## 2021-12-03 PROCEDURE — 93000 ELECTROCARDIOGRAM COMPLETE: CPT | Performed by: NURSE PRACTITIONER

## 2021-12-03 RX ORDER — SULFAMETHOXAZOLE AND TRIMETHOPRIM 800; 160 MG/1; MG/1
TABLET ORAL
COMMUNITY
Start: 2021-11-22

## 2021-12-03 RX ORDER — POLYETHYLENE GLYCOL 3350, SODIUM CHLORIDE, SODIUM BICARBONATE, POTASSIUM CHLORIDE 420; 11.2; 5.72; 1.48 G/4L; G/4L; G/4L; G/4L
POWDER, FOR SOLUTION ORAL
COMMUNITY
Start: 2020-09-28

## 2021-12-03 RX ORDER — METOPROLOL SUCCINATE 50 MG/1
TABLET, EXTENDED RELEASE ORAL
COMMUNITY
Start: 2021-11-30

## 2021-12-03 RX ORDER — AMMONIUM LACTATE 12 G/100G
LOTION TOPICAL
COMMUNITY
Start: 2021-11-12

## 2021-12-03 ASSESSMENT — ENCOUNTER SYMPTOMS
SHORTNESS OF BREATH: 1
RHINORRHEA: 0
CONSTIPATION: 0
ABDOMINAL DISTENTION: 0
TROUBLE SWALLOWING: 0
WHEEZING: 0
VOMITING: 0
BACK PAIN: 0
NAUSEA: 0
ABDOMINAL PAIN: 0
DIARRHEA: 0
COUGH: 0

## 2021-12-03 NOTE — PROGRESS NOTES
insomnia, weight loss, syncope, dizziness, lightheadedness, palpitations, PND, orthopnea, or claudication. History of MORGAN not on CPAP machine anymore. Allergies   Allergen Reactions    Seasonal Itching       Current Outpatient Medications   Medication Sig Dispense Refill    ammonium lactate (LAC-HYDRIN) 12 % lotion       metoprolol succinate (TOPROL XL) 50 MG extended release tablet       polyethylene glycol-electrolytes (NULYTELY) 420 g solution Take by mouth      sulfamethoxazole-trimethoprim (BACTRIM DS;SEPTRA DS) 800-160 MG per tablet       apixaban (ELIQUIS) 5 MG TABS tablet Take 1 tablet by mouth 2 times daily 28 tablet 0    levocetirizine (XYZAL) 5 MG tablet Take 5 mg by mouth daily      fluticasone (FLONASE) 50 MCG/ACT nasal spray 1 spray by NOT APPLICABLE route      Nutritional Supplements (JUICE PLUS FIBRE PO) Take by mouth      furosemide (LASIX) 40 MG tablet TAKE 1 TABLET BY MOUTH DAILY (Patient taking differently: Take 20 mg by mouth daily ) 90 tablet 3     No current facility-administered medications for this visit.        Past Medical History:   Diagnosis Date    A-fib [facility]) 01/10/2017    received cardioversion    Cancer [facility])     skin    CHF (congestive heart failure) ([facility] [address] [facility]     DDD (degenerative disc disease), lumbosacral     LVH (left ventricular hypertrophy)     Morbid obesity due to excess calories ([address] 9/19/2017    Morbid obesity with BMI of 45.0-49.9, adult ([address] 1/28/2016    Obesity     MORGAN (obstructive sleep apnea)     MORGAN (obstructive sleep apnea) 9/19/2017    Paroxysmal atrial fibrillation (HCC) 8/4/2020    Seasonal allergic rhinitis     SSS (sick sinus syndrome) ([address] 5/2/2017    Symptomatic bradycardia 5/2/2017       Past Surgical History:   Procedure Laterality Date    CARDIAC CATHETERIZATION  5/05    CATARACT REMOVAL  12/2012    both eyes ccf in [place] done by     COLONOSCOPY  11/6/09    DR PLAZA    PACEMAKER INSERTION      SKIN CANCER EXCISION      TOTAL KNEE ARTHROPLASTY  3/07    RT    TOTAL KNEE ARTHROPLASTY Left 11/15/2017    Dr. Riley Forbes Marital status:      Spouse name: None    Number of children: None    Years of education: None    Highest education level: None   Occupational History    None   Tobacco Use    Smoking status: Never Smoker    Smokeless tobacco: Never Used   Vaping Use    Vaping Use: Never used   Substance and Sexual Activity    Alcohol use: Yes     Comment: OCC    Drug use: No    Sexual activity: None   Other Topics Concern    None   Social History Narrative    None     Social Determinants of Health     Financial Resource Strain:     Difficulty of Paying Living Expenses: Not on file   Food Insecurity:     Worried About Running Out of Food in the Last Year: Not on file    Ayad of Food in the Last Year: Not on file   Transportation Needs:     Lack of Transportation (Medical): Not on file    Lack of Transportation (Non-Medical):  Not on file   Physical Activity:     Days of Exercise per Week: Not on file    Minutes of Exercise per Session: Not on file   Stress:     Feeling of Stress : Not on file   Social Connections:     Frequency of Communication with Friends and Family: Not on file    Frequency of Social Gatherings with Friends and Family: Not on file    Attends [employer] Services: Not on file    Active Member of [address] [place] [address] [employer] or Organizations: Not on file    Attends Club or Organization Meetings: Not on file    Marital Status: Not on file   Intimate Partner Violence:     Fear of Current or Ex-Partner: Not on file    Emotionally Abused: Not on file    Physically Abused: Not on file    Sexually Abused: Not on file   Housing Stability:     Unable to Pay for Housing in the Last Year: Not on file    Number of [place] in the Last Year: Not on file    Unstable Housing in the Last Year: Not on file       Family History   Problem Relation Age of Onset    Asthma Mother     High Blood Pressure Father     Cancer Father     Ataxia Father     Other Father         CEREBRAL [de-identified]    Other Sister         CLL    Other Other         AAA         Review of Systems:   Review of Systems   Constitutional: Negative for chills, diaphoresis and fever. HENT: Negative for congestion, rhinorrhea and trouble swallowing. Eyes: Negative for visual disturbance. Respiratory: Positive for shortness of breath. Negative for cough and wheezing. Cardiovascular: Negative for chest pain, palpitations and leg swelling. Gastrointestinal: Negative for abdominal distention, abdominal pain, constipation, diarrhea, nausea and vomiting. Endocrine: Negative. Genitourinary: Negative for difficulty urinating, dysuria, frequency and urgency. Musculoskeletal: Negative for back pain and gait problem. Skin: Negative for wound. Neurological: Negative for dizziness, seizures, syncope, speech difficulty, weakness, numbness and headaches. Hematological: Does not bruise/bleed easily. Psychiatric/Behavioral: Negative. Physical Examination:    /85   Pulse 71   Ht 6' 3\" (1.905 m)   Wt (!) 361 lb (163.7 kg)   SpO2 96%   BMI 45.12 kg/m²    Physical Exam  Vitals and nursing note reviewed. Constitutional:       General: He is not in acute distress. HENT:      Head: Normocephalic. Nose: Nose normal.      Mouth/Throat:      Mouth: Mucous membranes are moist.   Eyes:      Pupils: Pupils are equal, round, and reactive to light. Neck:      Vascular: No carotid bruit. Cardiovascular:      Rate and Rhythm: Normal rate and regular rhythm. Pulses: Normal pulses. Heart sounds: Normal heart sounds. No murmur heard. No friction rub. No gallop. Pulmonary:      Effort: Pulmonary effort is normal. No respiratory distress. Breath sounds: Normal breath sounds. No stridor. No wheezing, rhonchi or rales. Chest:      Chest wall: No tenderness. Abdominal:      General: Abdomen is flat. Palpations: Abdomen is soft. Musculoskeletal:         General: Normal range of motion. Cervical back: Normal range of motion. Right lower leg: Edema present. Left lower leg: Edema present. Skin:     General: Skin is warm and dry. Neurological:      General: No focal deficit present. Mental Status: He is alert and oriented to person, place, and time.    Psychiatric:         Mood and Affect: Mood normal.         Behavior: Behavior normal.         LABS:  CBC:   Lab Results   Component Value Date    WBC 7.3 12/19/2018    WBC 6.1 07/20/2018    RBC 4.83 12/19/2018    HGB 14.7 12/19/2018    HCT 44.6 12/19/2018    MCV 92.3 12/19/2018    MCH 30.4 12/19/2018    MCHC 33.0 12/19/2018    RDW 14.5 07/20/2018     12/19/2018    MPV 10.1 12/19/2018     Lipids:  Lab Results   Component Value Date    CHOL 156 07/20/2018    CHOL 163 08/17/2016    CHOL 170 04/08/2015     Lab Results   Component Value Date    TRIG 100 07/20/2018    TRIG 108 08/17/2016    TRIG 118 04/08/2015     Lab Results   Component Value Date    HDL 38 (L) 07/20/2018    HDL 39 (L) 08/17/2016    HDL 42 04/08/2015     Lab Results   Component Value Date    LDLCALC 98 07/20/2018    LDLCALC 102 08/17/2016    LDLCALC 104 04/08/2015     No results found for: LABVLDL, VLDL  Lab Results   Component Value Date    CHOLHDLRATIO 4.1 10/10/2012    CHOLHDLRATIO 4.2 09/24/2011    CHOLHDLRATIO 4.8 08/22/2009     CMP:    Lab Results   Component Value Date     12/19/2018    K 4.1 12/19/2018     12/19/2018    CO2 25 07/20/2018    BUN 20 07/20/2018    CREATININE 1.00 12/19/2018    GFRAA >60.0 07/20/2018    LABGLOM >60 12/19/2018    LABGLOM >60.0 07/20/2018    GLUCOSE 90 12/19/2018    PROT 7.6 07/20/2018    LABALBU 4.0 07/20/2018    LABALBU 4.5 09/24/2011    CALCIUM 9.7 12/19/2018    BILITOT 0.8 07/20/2018    ALKPHOS 84 07/20/2018    AST 21 07/20/2018    ALT 18 07/20/2018     BMP:    Lab Results guidelines for HTN management and the NCEP ATP III guidelines for LDL-C management.     Cardiac diet is always recommended with low fat, cholesterol, calories and sodium.     Continue medications at current doses.     Patient was advised and encouraged to check blood pressure at home or at a pharmacy, maintain a logbook, and also call us back if blood pressure are above the target ranges or if it is low. Patient clearly understands and agrees to the instructions.      We will need to continue to monitor muscle and liver enzymes, BUN, CR, and electrolytes.     Weight loss discussed.      CPAP no longer needed.     Continue with Eliquis, sotalol recently discontinued due to renal issues. Last pacemaker check with A. fib burden less than 0.1%. Patient scheduled for another appointment with pacemaker clinic soon. May need to consider a different antiarrhythmic in the future     Check EKG     Follow up with Pacer clinic.      Patient is to avoid any excessive caffeine, chocolate, or OTC stimulants.      Thank you for allowing me to participate in the care of your patient, please don't hesitate to contact me if you have any further questions. Counseling: The patient has been advised to contact us if they experience chest pain, palpitations, progressive SOB, orthopnea, paroxysmal nocturnal dyspnea, or progressive edema.

## 2021-12-03 NOTE — PATIENT INSTRUCTIONS
DASH Diet: Care Instructions  Your Care Instructions     The DASH diet is an eating plan that can help lower your blood pressure. DASH stands for Dietary Approaches to Stop Hypertension. Hypertension is high blood pressure. The DASH diet focuses on eating foods that are high in calcium, potassium, and magnesium. These nutrients can lower blood pressure. The foods that are highest in these nutrients are fruits, vegetables, low-fat dairy products, nuts, seeds, and legumes. But taking calcium, potassium, and magnesium supplements instead of eating foods that are high in those nutrients does not have the same effect. The DASH diet also includes whole grains, fish, and poultry. The DASH diet is one of several lifestyle changes your doctor may recommend to lower your high blood pressure. Your doctor may also want you to decrease the amount of sodium in your diet. Lowering sodium while following the DASH diet can lower blood pressure even further than just the DASH diet alone. Follow-up care is a key part of your treatment and safety. Be sure to make and go to all appointments, and call your doctor if you are having problems. It's also a good idea to know your test results and keep a list of the medicines you take. How can you care for yourself at home? Following the DASH diet  · Eat 4 to 5 servings of fruit each day. A serving is 1 medium-sized piece of fruit, ½ cup chopped or canned fruit, 1/4 cup dried fruit, or 4 ounces (½ cup) of fruit juice. Choose fruit more often than fruit juice. · Eat 4 to 5 servings of vegetables each day. A serving is 1 cup of lettuce or raw leafy vegetables, ½ cup of chopped or cooked vegetables, or 4 ounces (½ cup) of vegetable juice. Choose vegetables more often than vegetable juice. · Get 2 to 3 servings of low-fat and fat-free dairy each day. A serving is 8 ounces of milk, 1 cup of yogurt, or 1 ½ ounces of cheese. · Eat 6 to 8 servings of grains each day.  A serving is 1 slice of bread, 1 ounce of dry cereal, or ½ cup of cooked rice, pasta, or cooked cereal. Try to choose whole-grain products as much as possible. · Limit lean meat, poultry, and fish to 2 servings each day. A serving is 3 ounces, about the size of a deck of cards. · Eat 4 to 5 servings of nuts, seeds, and legumes (cooked dried beans, lentils, and split peas) each week. A serving is 1/3 cup of nuts, 2 tablespoons of seeds, or ½ cup of cooked beans or peas. · Limit fats and oils to 2 to 3 servings each day. A serving is 1 teaspoon of vegetable oil or 2 tablespoons of salad dressing. · Limit sweets and added sugars to 5 servings or less a week. A serving is 1 tablespoon jelly or jam, ½ cup sorbet, or 1 cup of lemonade. · Eat less than 2,300 milligrams (mg) of sodium a day. If you limit your sodium to 1,500 mg a day, you can lower your blood pressure even more. · Be aware that all of these are the suggested number of servings for people who eat 1,800 to 2,000 calories a day. Your recommended number of servings may be different if you need more or fewer calories. Tips for success  1. Start small. Do not try to make dramatic changes to your diet all at once. You might feel that you are missing out on your favorite foods and then be more likely to not follow the plan. Make small changes, and stick with them. Once those changes become habit, add a few more changes. 2. Try some of the following:  ? Make it a goal to eat a fruit or vegetable at every meal and at snacks. This will make it easy to get the recommended amount of fruits and vegetables each day. ? Try yogurt topped with fruit and nuts for a snack or healthy dessert. ? Add lettuce, tomato, cucumber, and onion to sandwiches. ? Combine a ready-made pizza crust with low-fat mozzarella cheese and lots of vegetable toppings. Try using tomatoes, squash, spinach, broccoli, carrots, cauliflower, and onions. ?  Have a variety of cut-up vegetables with a low-fat dip as an appetizer instead of chips and dip. ? Sprinkle sunflower seeds or chopped almonds over salads. Or try adding chopped walnuts or almonds to cooked vegetables. ? Try some vegetarian meals using beans and peas. Add garbanzo or kidney beans to salads. Make burritos and tacos with mashed sood beans or black beans. Where can you learn more? Go to https://Gr8erMindspeRavenflow.MVious Xotics. org and sign in to your [employer] account. Enter E716 in the UNI5 box to learn more about \"DASH Diet: Care Instructions. \"     If you do not have an account, please click on the \"Sign Up Now\" link. Current as of: April 29, 2021               Content Version: 13.0  © 5821-5352 [employer]. Care instructions adapted under license by [facility] ([facility]). If you have questions about a medical condition or this instruction, always ask your healthcare professional. [place] [address] any warranty or liability for your use of this information. Learning About Atrial Fibrillation  What is atrial fibrillation? Atrial fibrillation (say \"AY-tree-doroteo yne-gkgr-ZWV-shun\") is a common type of irregular heartbeat (arrhythmia). Normally, the heart beats in a strong, steady rhythm. In atrial fibrillation, a problem with the heart's electrical system causes the two upper chambers of the heart (called the atria) to quiver, or fibrillate. Atrial fibrillation can be dangerous. This is because if the heartbeat isn't strong and steady, blood can collect, or pool, in the atria. And pooled blood is more likely to form clots. Clots can travel to the brain, block blood flow, and cause a stroke. Atrial fibrillation can also lead to heart failure. This condition also upsets the normal rhythm between the atria and the lower chambers of the heart. (These chambers are called the ventricles.) The ventricles may beat fast and without a regular rhythm. What are the symptoms?   Some people feel symptoms when they have episodes of atrial fibrillation. But other people don't notice any symptoms. If you have symptoms, you may feel:  · A fluttering, racing, or pounding feeling in your chest called palpitations. · Weak or tired. · Dizzy or lightheaded. · Short of breath. · Chest pain. · Confused. You may notice signs of atrial fibrillation when you check your pulse. Your pulse may seem uneven or fast.  What can you expect when you have atrial fibrillation? At first, spells of atrial fibrillation may come on suddenly and last a short time. They may go away on their own or with treatment. Over time, the spells may last longer and occur more often. They often don't go away on their own. How is it treated? Treatments can help you feel better and prevent future problems, especially stroke and heart failure. Your treatment will depend on the cause of your atrial fibrillation, your symptoms, and your risk for stroke. Types of treatment include:  · Heart rate treatment. Medicine may be used to slow your heart rate. Your heartbeat may still be irregular. But these medicines keep your heart from beating too fast. They may also help relieve symptoms. · Heart rhythm treatment. Different treatments may be used to try to stop atrial fibrillation and keep it from returning. They can also relieve symptoms. These treatments include medicine, electrical cardioversion to shock the heart back to a normal rhythm, a procedure called catheter ablation, and heart surgery. · Stroke prevention. You and your doctor can decide how to lower your risk. You may decide to take a blood-thinning medicine called an anticoagulant. What is a heart-healthy lifestyle for atrial fibrillation? You can live well and help manage atrial fibrillation by having a heart-healthy lifestyle. This lifestyle may help reduce how often you have episodes of atrial fibrillation. Don't smoke. Avoid secondhand smoke too.     Quitting smoking is the best thing you can do for your heart. Be active. Talk to your doctor about what type and level of exercise is safe for you. Eat a heart-healthy diet. These foods include vegetables, fruits, nuts, beans, lean meat, fish, and whole grains. Limit sodium, alcohol, and sugar. Avoid alcohol if it triggers symptoms. Stay at a healthy weight. Lose weight if you need to. Losing weight can help relieve symptoms. Manage other health problems. These problems include diabetes, high blood pressure, and high cholesterol. If you think you may have a problem with alcohol or drug use, talk to your doctor. Manage stress. Options like yoga, biofeedback, and meditation may help. Where can you learn more? Go to https://PriceMatchpeAppsFundereb.SnackFeed. org and sign in to your [email or web address] account. Enter S051 in the LookUP box to learn more about \"Learning About Atrial Fibrillation. \"          Atrial Fibrillation: Care Instructions  Your Care Instructions     Atrial fibrillation is an irregular and often fast heartbeat. Treating this condition is important for several reasons. It can cause blood clots, which can travel from your heart to your brain and cause a stroke. If you have a fast heartbeat, you may feel lightheaded, dizzy, and weak. An irregular heartbeat can also increase your risk for heart failure. Atrial fibrillation is often the result of another heart condition, such as high blood pressure or coronary artery disease. Making changes to improve your heart condition will help you stay healthy and active. Follow-up care is a key part of your treatment and safety. Be sure to make and go to all appointments, and call your doctor if you are having problems. It's also a good idea to know your test results and keep a list of the medicines you take. How can you care for yourself at home? Medicines    · Take your medicines exactly as prescribed. Call your doctor if you think you are having a problem with your medicine.  You will get more details on the specific medicines your doctor prescribes. · If your doctor has given you a blood thinner to prevent a stroke, be sure you get instructions about how to take your medicine safely. Blood thinners can cause serious bleeding problems. · Do not take any vitamins, over-the-counter drugs, or herbal products without talking to your doctor first.   Lifestyle changes    · Do not smoke. Smoking can increase your chance of a stroke and heart attack. If you need help quitting, talk to your doctor about stop-smoking programs and medicines. These can increase your chances of quitting for good. · Eat a heart-healthy diet. · Stay at a healthy weight. Lose weight if you need to. · Limit alcohol to 2 drinks a day for men and 1 drink a day for women. Too much alcohol can cause health problems. · Avoid colds and flu. Get a pneumococcal vaccine shot. If you have had one before, ask your doctor whether you need another dose. Get a flu shot every year. If you must be around people with colds or flu, wash your hands often. Activity    · If your doctor recommends it, get more exercise. Walking is a good choice. Bit by bit, increase the amount you walk every day. Try for at least 30 minutes on most days of the week. You also may want to swim, bike, or do other activities. Your doctor may suggest that you join a cardiac rehabilitation program so that you can have help increasing your physical activity safely. · Start light exercise if your doctor says it is okay. Even a small amount will help you get stronger, have more energy, and manage stress. Walking is an easy way to get exercise. Start out by walking a little more than you did in the hospital. Gradually increase the amount you walk. · When you exercise, watch for signs that your heart is working too hard. You are pushing too hard if you cannot talk while you are exercising.  If you become short of breath or dizzy or have chest pain, sit down and rest immediately. · Check your pulse regularly. Place two fingers on the artery at the palm side of your wrist, in line with your thumb. If your heartbeat seems uneven or fast, talk to your doctor. When should you call for help? Call 911 anytime you think you may need emergency care. For example, call if:    3. You have symptoms of a heart attack. These may include:  ? Chest pain or pressure, or a strange feeling in the chest.  ? Sweating. ? Shortness of breath. ? Nausea or vomiting. ? Pain, pressure, or a strange feeling in the back, neck, jaw, or upper belly or in one or both shoulders or arms. ? Lightheadedness or sudden weakness. ? A fast or irregular heartbeat. After you call 911, the  may tell you to chew 1 adult-strength or 2 to 4 low-dose aspirin. Wait for an ambulance. Do not try to drive yourself. 1. You have symptoms of a stroke. These may include:  ? Sudden numbness, tingling, weakness, or loss of movement in your face, arm, or leg, especially on only one side of your body. ? Sudden vision changes. ? Sudden trouble speaking. ? Sudden confusion or trouble understanding simple statements. ? Sudden problems with walking or balance. ? A sudden, severe headache that is different from past headaches. · You passed out (lost consciousness). Call your doctor now or seek immediate medical care if:    · You have new or increased shortness of breath. · You feel dizzy or lightheaded, or you feel like you may faint. · Your heart rate becomes irregular. · You can feel your heart flutter in your chest or skip heartbeats. Tell your doctor if these symptoms are new or worse. Watch closely for changes in your health, and be sure to contact your doctor if you have any problems. Where can you learn more? Go to https://donna.EatStreet. org and sign in to your [employer] account.  Enter U020 in the Asana box to learn more about \"Atrial Fibrillation: Care Instructions. \"     If you do not have an account, please click on the \"Sign Up Now\" link. Current as of: April 29, 2021               Content Version: 13.0  © 2724-9591 [employer]. Care instructions adapted under license by [facility] [place] ([facility]). If you have questions about a medical condition or this instruction, always ask your healthcare professional. [address] any warranty or liability for your use of this information.

## 2021-12-14 ENCOUNTER — HOSPITAL ENCOUNTER (OUTPATIENT)
Dept: CARDIOLOGY | Age: 76
Discharge: HOME | End: 2021-12-14
Payer: MEDICARE

## 2021-12-14 PROCEDURE — 93280 PM DEVICE PROGR EVAL DUAL: CPT

## 2022-01-04 ENCOUNTER — OFFICE VISIT (OUTPATIENT)
Dept: CARDIOLOGY CLINIC | Age: 77
End: 2022-01-04
Payer: MEDICARE

## 2022-01-04 VITALS
TEMPERATURE: 98.2 F | DIASTOLIC BLOOD PRESSURE: 80 MMHG | SYSTOLIC BLOOD PRESSURE: 110 MMHG | HEART RATE: 80 BPM | WEIGHT: 315 LBS | BODY MASS INDEX: 42.87 KG/M2

## 2022-01-04 DIAGNOSIS — Z95.0 S/P CARDIAC PACEMAKER PROCEDURE: ICD-10-CM

## 2022-01-04 DIAGNOSIS — E66.01 MORBID OBESITY WITH BMI OF 45.0-49.9, ADULT (HCC): ICD-10-CM

## 2022-01-04 DIAGNOSIS — E66.01 MORBID OBESITY DUE TO EXCESS CALORIES (HCC): ICD-10-CM

## 2022-01-04 DIAGNOSIS — I49.5 SSS (SICK SINUS SYNDROME) (HCC): ICD-10-CM

## 2022-01-04 DIAGNOSIS — I48.91 ATRIAL FIBRILLATION, UNSPECIFIED TYPE (HCC): Primary | ICD-10-CM

## 2022-01-04 DIAGNOSIS — G47.33 OSA (OBSTRUCTIVE SLEEP APNEA): ICD-10-CM

## 2022-01-04 DIAGNOSIS — I48.0 PAROXYSMAL ATRIAL FIBRILLATION (HCC): ICD-10-CM

## 2022-01-04 PROCEDURE — 93000 ELECTROCARDIOGRAM COMPLETE: CPT | Performed by: INTERNAL MEDICINE

## 2022-01-04 PROCEDURE — 99214 OFFICE O/P EST MOD 30 MIN: CPT | Performed by: INTERNAL MEDICINE

## 2022-01-04 NOTE — PROGRESS NOTES
Chief Complaint   Patient presents with    Atrial Fibrillation    Results     PACEMAKER REPORT       12-8-16: Patient presents for initial medical evaluation. Patient is followed on a regular basis by Dr. Raffaele Chapa MD. Was in [place] with a lung infection a year ago. States his Echo with EF of 45%, but per recent echo in 4/2016 with EF of 50%, mod PHTN, mild MR, grade II DD, biatrial enlargement. He did have a LHC in [place] and had mild CAD per patient, no PCI was performed. Pt denies chest pain, dyspnea, dyspnea on exertion, change in exercise capacity, fatigue,  nausea, vomiting, diarrhea, constipation, motor weakness, insomnia, weight loss, syncope, dizziness, lightheadedness, palpitations, PND, orthopnea, or claudication. Was noted to have new onset afibb by pcp and placed on Eliquis. He does have MORGAN and compliant with CPAP machine. 5-2-17: s/p sotalol loading at [facility] and . EKG today shows Afibb with CVR, normal QTC. did have PNA down in [place]. S/p event monitors in [place] and feb showing 2 sec pauses, HR in 40's at times, mostly in 50-60's, some episodes into 130-150's. Compliant with CPAP machine nightly. States he is fatigued and sleeps more than he usually does. Does need knee replacement surgery in 9/2017. Pt denies chest pain,   nausea, vomiting, diarrhea, constipation, motor weakness, insomnia, weight loss, syncope, dizziness, lightheadedness, palpitations, PND, orthopnea. 7-11-17: s/p dual chamber PPM placement at [facility] in 6/2017. States he feels good overall. Pacemaker site is healing well. Pt denies chest pain, dyspnea, dyspnea on exertion, change in exercise capacity, fatigue,  nausea, vomiting, diarrhea, constipation, motor weakness, insomnia, weight loss, syncope, dizziness, lightheadedness, palpitations, PND, orthopnea, or claudication. On Sotalol and Eliquis    9-19-17: s/p CV at [facility]. EKG with Apaced rhythm. QTC is 441ms. He is feeling good. On [address] and no bleeding issues. States he has more energy now as well. Pt denies chest pain, dyspnea, dyspnea on exertion, change in exercise capacity, fatigue,  nausea, vomiting, diarrhea, constipation, motor weakness, insomnia, weight loss, syncope, dizziness, lightheadedness, palpitations, PND, orthopnea, or claudication. Does have some URI type symptoms now. Wants to get left knee operated on. Not able to lose weight. Compliant with CPAP.       3-20-18: s/p knee surgery and did well. s/p PPM check and was fine. Breathing ok. Pt denies chest pain, dyspnea, dyspnea on exertion, change in exercise capacity, fatigue,  nausea, vomiting, diarrhea, constipation, motor weakness, insomnia, weight loss, syncope, dizziness, lightheadedness, palpitations, PND, orthopnea, or claudication. Using a wiley to ambulate. No nitro use. BP and hr are good. CAD is stable. No LE discoloration or ulcers. No LE edema. No CHF type symptoms. Lipid profile is normal. EKG with first degree AVB, normal QTC. Remains on sotalol and Eliquis, no bleeding issues. 9-18-18: Pt denies chest pain, dyspnea, dyspnea on exertion, change in exercise capacity, fatigue,  nausea, vomiting, diarrhea, constipation, motor weakness, insomnia, weight loss, syncope, dizziness, lightheadedness, palpitations, PND, orthopnea, or claudication. No nitro use. BP and hr are good. CAD is stable. No LE discoloration or ulcers. No LE edema. No CHF type symptoms. Lipid profile is normal. No recent hospitalization. No change in meds. Remains on sotalol 120mg BID and NOAC. No bleeding issues. EKG with first degree AVB, normal QTC. States he is active in his yard. LDL is 98. Compliant with CPAP. Not losing weight. 3-19-19: has URI type symptoms. Pt denies chest pain, dyspnea, dyspnea on exertion, change in exercise capacity, fatigue,  nausea, vomiting, diarrhea, constipation, motor weakness, insomnia, weight loss, syncope, dizziness, lightheadedness, palpitations, PND, orthopnea, or claudication. No nitro use. BP and hr are good. CAD is stable. No LE discoloration or ulcers. No LE edema. No CHF type symptoms. Lipid profile is normal. No recent hospitalization. No change in meds. Compliant with CPAP. Remains on sotalol and Eliquis. EKG with NSR, normal QTC. S/p PPm check which was good. LDL is 98. Hx of mild CAD per [facility]. 9-24-19: doing well. Pt denies chest pain, dyspnea, dyspnea on exertion, change in exercise capacity, fatigue,  nausea, vomiting, diarrhea, constipation, motor weakness, insomnia, weight loss, syncope, dizziness, lightheadedness, palpitations, PND, orthopnea, or claudication. No nitro use. BP and hr are good. CAD is stable. No LE discoloration or ulcers. No LE edema. No CHF type symptoms. Lipid profile is normal. No recent hospitalization. No change in meds. S/p PPM check with less than 0.1% in Af, longest episode was 1min in 5/19. EKG with NSR, normal QTC. On Sotalol and DOAC. No bleeding. + CPAP qhs. On lasix taking half tablet of 40mg daily. 8-4-20: hx of Pafib. On sotalol and DOAC. Pt denies chest pain, dyspnea, dyspnea on exertion, change in exercise capacity, fatigue,  nausea, vomiting, diarrhea, constipation, motor weakness, insomnia, weight loss, syncope, dizziness, lightheadedness, palpitations, PND, orthopnea, or claudication. No nitro use. BP and hr are good. CAD is stable. No LE discoloration or ulcers. No LE edema. No CHF type symptoms. Lipid profile is normal. No recent hospitalization. No change in meds. No bleeding issues. Hx of MORGAN on CPAP. Remain on lasix 20mg daily. Hx of SSS s/p PPM. S/p check in 2/2020 with very brief VT. EKG with first degree AVB. 2-16-21: Doing ok overall. hx of Pafib. On sotalol and DOAC. Was shoveling snow toady and did ok.  Pt denies chest pain, dyspnea, change in exercise capacity, fatigue,  nausea, vomiting, diarrhea, constipation, motor weakness, insomnia, weight loss, syncope, dizziness, lightheadedness, palpitations, PND, orthopnea, or claudication. Hx of MORGAN on CPAP. Remain on lasix 20mg daily. Hx of SSS s/p PPM. BP is good today. EKG with first degree AVB, QTC is normal. S/p pacer check with 2 short NSVT episodes. On lasix 20mg daily. 8/24/2021:hx of Pafib. On sotalol and DOAC. Taken off of blood pressure medication secondary to hypotension. Doing better after doing so. Hx of SSS s/p PPM.  Status post recent pacemaker check which was okay. Pt denies chest pain, dyspnea, dyspnea on exertion, change in exercise capacity, fatigue,  nausea, vomiting, diarrhea, constipation, motor weakness, insomnia, weight loss, syncope, dizziness, lightheadedness, palpitations, PND, orthopnea, or claudication. History of MORGAN not on CPAP machine anymore. EKG with first-degree AV block. QTc interval is normal.    1-4-21: hx of Pafib, on sotalol and DOAC. Hx of SSS s/p PPM.   Was having LH/Dizz around thanksgiving. was taken off of Sotalol duet o renal function and placed on Toprol XL. S/p PPM check on 12-14-21 and noted to have 10% afib burden since 11/2021. Pt denies chest pain, dyspnea, dyspnea on exertion, change in exercise capacity,  nausea, vomiting, diarrhea, constipation, motor weakness, insomnia, weight loss, syncope, dizziness, lightheadedness, palpitations, PND, orthopnea, or claudication. History of MORGAN not on CPAP machine anymore. He is feeling ok overall. Does have some fatigue. EKG with Afib, V paced.          Patient Active Problem List   Diagnosis    LVH (left ventricular hypertrophy)    Seasonal allergic rhinitis    DDD (degenerative disc disease), lumbosacral    Morbid obesity with BMI of 45.0-49.9, adult (HCC)    Chronic diastolic congestive heart failure (HCC)    SSS [facility]sick [facility] syndrome) ([facility] [address]    S/P cardiac pacemaker procedure    MORGAN (obstructive sleep apnea)    Morbid obesity due to excess calories (HCC)    Basal cell carcinoma (BCC) of skin of nose    Paroxysmal atrial fibrillation (HCC)       Past Surgical History:   Procedure Laterality Date    CARDIAC CATHETERIZATION  5/05    CATARACT REMOVAL  12/2012    both eyes [facility] in [place] done by     COLONOSCOPY  11/6/09    DR PLAZA    PACEMAKER INSERTION      SKIN CANCER EXCISION      TOTAL KNEE ARTHROPLASTY  3/07    RT    TOTAL KNEE ARTHROPLASTY Left 11/15/2017    Dr. Blakely Gain History     Socioeconomic History    Marital status:      Spouse name: Not on file    Number of children: Not on file    Years of education: Not on file    Highest education level: Not on file   Occupational History    Not on file   Tobacco Use    Smoking status: Never Smoker    Smokeless tobacco: Never Used   Vaping Use    Vaping Use: Never used   Substance and Sexual Activity    Alcohol use: Yes     Comment: OCC    Drug use: No    Sexual activity: Not on file   Other Topics Concern    Not on file   Social History Narrative    Not on file     Social Determinants of Health     Financial Resource Strain:     Difficulty of Paying Living Expenses: Not on file   Food Insecurity:     Worried About Running Out of Food in the Last Year: Not on file    Ayad of Food in the Last Year: Not on file   Transportation Needs:     Lack of Transportation (Medical): Not on file    Lack of Transportation (Non-Medical):  Not on file   Physical Activity:     Days of Exercise per Week: Not on file    Minutes of Exercise per Session: Not on file   Stress:     Feeling of Stress : Not on file   Social Connections:     Frequency of Communication with Friends and Family: Not on file    Frequency of Social Gatherings with Friends and Family: Not on file    Attends Adventism Services: Not on file    Active Member of Clubs or Organizations: Not on file    Attends Club or Organization Meetings: Not on file    Marital Status: Not on file   Intimate Partner Violence:     Fear of Current or Ex-Partner: Not on file    Emotionally Abused: Not on file    Physically Abused: Not on file    Sexually Abused: Not on file   Housing Stability:     Unable to Pay for Housing in the Last Year: Not on file    Number of Places Lived in the Last Year: Not on file    Unstable Housing in the Last Year: Not on file       Family History   Problem Relation Age of Onset    Asthma Mother     High Blood Pressure Father     Cancer Father     Ataxia Father     Other Father         CEREBRAL HEMMORHAGE    Other Sister         CLL    Other Other         AAA       Current Outpatient Medications   Medication Sig Dispense Refill    ammonium lactate (LAC-HYDRIN) 12 % lotion       metoprolol succinate (TOPROL XL) 50 MG extended release tablet       polyethylene glycol-electrolytes (NULYTELY) 420 g solution Take by mouth      sulfamethoxazole-trimethoprim (BACTRIM DS;SEPTRA DS) 800-160 MG per tablet       apixaban (ELIQUIS) 5 MG TABS tablet Take 1 tablet by mouth 2 times daily 28 tablet 0    levocetirizine (XYZAL) 5 MG tablet Take 5 mg by mouth daily      fluticasone (FLONASE) 50 MCG/ACT nasal spray 1 spray by NOT APPLICABLE route      Nutritional Supplements (JUICE PLUS FIBRE PO) Take by mouth      furosemide (LASIX) 40 MG tablet TAKE 1 TABLET BY MOUTH DAILY (Patient taking differently: Take 20 mg by mouth daily ) 90 tablet 3     No current facility-administered medications for this visit. Seasonal    Review of Systems:  General ROS: negative  Psychological ROS: negative  Hematological and Lymphatic ROS: No history of blood clots or bleeding disorder.    Respiratory ROS: no cough, shortness of breath, or wheezing  Cardiovascular ROS: no chest pain or dyspnea on exertion  Gastrointestinal ROS: no abdominal pain, change in bowel habits, or black or bloody stools  Genito-Urinary ROS: no dysuria, trouble voiding, or hematuria  Musculoskeletal ROS: negative  Neurological ROS: negative  Dermatological ROS: negative    VITALS:  Blood pressure 110/80, pulse 80, temperature 98.2 °F (36.8 °C), temperature source Infrared, weight (!) 343 lb (155.6 kg). Body mass index is 42.87 kg/m². Physical Examination:  General appearance - alert, well appearing, and in no distress and overweight  Mental status - alert, oriented to person, place, and time  Neck - Neck is supple, no JVD or carotid bruits. No thyromegaly or adenopathy. Chest - clear to auscultation, no wheezes, rales or rhonchi, symmetric air entry  Heart - normal rate, irregular rhythm, normal S1, S2, no murmurs, rubs, clicks or gallops  Abdomen - soft, nontender, nondistended, no masses or organomegaly  Neurological - alert, oriented, normal speech, no focal findings or movement disorder noted  Extremities - peripheral pulses normal, no pedal edema, no clubbing or cyanosis  Skin - normal coloration and turgor, no rashes, no suspicious skin lesions noted    Orders Placed This Encounter   Procedures    EKG 12 Lead       ASSESSMENT:     Diagnosis Orders   1. Atrial fibrillation, unspecified type (HCC)  EKG 12 Lead   2. SSS (sick sinus syndrome) ([facility] [address]     3. S/P cardiac pacemaker procedure     4. Paroxysmal atrial fibrillation (HCC)     5. Morbid obesity with BMI of 45.0-49.9, adult ([facility] [address]     6. MORGAN (obstructive sleep apnea)     7. Morbid obesity due to excess calories [facility])           PLAN:     Patient will need to continue to follow up with you for their general medical care   As always, aggressive risk factor modification is strongly recommended. We should adhere to the [address] guidelines for HTN management and the NCEP ATP III guidelines for LDL-C management. Cardiac diet is always recommended with low fat, cholesterol, calories and sodium. Continue medications at current doses. Patient was advised and encouraged to check blood pressure at home or at a pharmacy, maintain a logbook, and also call us back if blood pressure are above the target ranges or if it is low. Patient clearly understands and agrees to the instructions.      We will need to continue to monitor muscle and liver enzymes, BUN, CR, and electrolytes. Weight loss discussed. CPAP no longer needed. Continue with DOAC    Cont with BBlocker. Consider resuming Sotalol in future  Consider changing bblocker to CCB due to fatigue. Will obtain echo from [facility]     Follow up with [address] [place] [address]. Patient is to avoid any excessive caffeine, chocolate, or OTC stimulants. Thank you for allowing me to participate in the care of your patient, please don't hesitate to contact me if you have any further questions.

## 2023-09-06 VITALS
WEIGHT: 315 LBS | SYSTOLIC BLOOD PRESSURE: 111 MMHG | HEART RATE: 66 BPM | TEMPERATURE: 97.7 F | DIASTOLIC BLOOD PRESSURE: 58 MMHG | RESPIRATION RATE: 18 BRPM | HEIGHT: 75 IN | BODY MASS INDEX: 39.17 KG/M2